# Patient Record
Sex: MALE | Race: WHITE | Employment: OTHER | URBAN - NONMETROPOLITAN AREA
[De-identification: names, ages, dates, MRNs, and addresses within clinical notes are randomized per-mention and may not be internally consistent; named-entity substitution may affect disease eponyms.]

---

## 2022-04-05 DIAGNOSIS — M19.90 ARTHRITIS: ICD-10-CM

## 2022-04-05 DIAGNOSIS — E78.00 HYPERCHOLESTEROLEMIA: ICD-10-CM

## 2022-05-04 PROBLEM — I25.10 CAD (CORONARY ARTERY DISEASE): Status: ACTIVE | Noted: 2022-05-04

## 2022-05-04 PROBLEM — E78.00 HYPERCHOLESTEROLEMIA: Status: ACTIVE | Noted: 2022-05-04

## 2022-05-04 PROBLEM — M19.90 ARTHRITIS: Status: ACTIVE | Noted: 2022-05-04

## 2022-05-04 RX ORDER — GLUCOSA SU 2KCL/CHONDROITIN SU 500-400 MG
100 CAPSULE ORAL DAILY
COMMUNITY
Start: 2020-07-06

## 2022-05-04 RX ORDER — LANOLIN ALCOHOL/MO/W.PET/CERES
400 CREAM (GRAM) TOPICAL DAILY
COMMUNITY
Start: 2020-07-06

## 2022-06-19 ENCOUNTER — APPOINTMENT (OUTPATIENT)
Dept: GENERAL RADIOLOGY | Age: 87
DRG: 177 | End: 2022-06-19
Payer: MEDICARE

## 2022-06-19 ENCOUNTER — APPOINTMENT (OUTPATIENT)
Dept: CT IMAGING | Age: 87
DRG: 177 | End: 2022-06-19
Payer: MEDICARE

## 2022-06-19 ENCOUNTER — HOSPITAL ENCOUNTER (INPATIENT)
Age: 87
LOS: 3 days | Discharge: HOME HEALTH | DRG: 177 | End: 2022-06-22
Attending: STUDENT IN AN ORGANIZED HEALTH CARE EDUCATION/TRAINING PROGRAM | Admitting: INTERNAL MEDICINE
Payer: MEDICARE

## 2022-06-19 DIAGNOSIS — U07.1 COVID-19: Primary | ICD-10-CM

## 2022-06-19 DIAGNOSIS — I48.91 ATRIAL FIBRILLATION, UNSPECIFIED TYPE (HCC): ICD-10-CM

## 2022-06-19 PROBLEM — R53.81 PHYSICAL DEBILITY: Status: ACTIVE | Noted: 2022-06-19

## 2022-06-19 LAB
ALBUMIN SERPL-MCNC: 4.3 G/DL (ref 3.5–5.1)
ALP BLD-CCNC: 85 U/L (ref 38–126)
ALT SERPL-CCNC: 17 U/L (ref 11–66)
ANION GAP SERPL CALCULATED.3IONS-SCNC: 14 MEQ/L (ref 8–16)
AST SERPL-CCNC: 31 U/L (ref 5–40)
BASE EXCESS MIXED: 2.2 MMOL/L (ref -2–3)
BASOPHILS # BLD: 0.5 %
BASOPHILS ABSOLUTE: 0 THOU/MM3 (ref 0–0.1)
BILIRUB SERPL-MCNC: 1.5 MG/DL (ref 0.3–1.2)
BUN BLDV-MCNC: 10 MG/DL (ref 7–22)
CALCIUM SERPL-MCNC: 10 MG/DL (ref 8.5–10.5)
CHLORIDE BLD-SCNC: 96 MEQ/L (ref 98–111)
CO2: 26 MEQ/L (ref 23–33)
COLLECTED BY:: NORMAL
CREAT SERPL-MCNC: 1 MG/DL (ref 0.4–1.2)
D-DIMER QUANTITATIVE: 2427 NG/ML FEU (ref 0–500)
EOSINOPHIL # BLD: 0.3 %
EOSINOPHILS ABSOLUTE: 0 THOU/MM3 (ref 0–0.4)
ERYTHROCYTE [DISTWIDTH] IN BLOOD BY AUTOMATED COUNT: 13 % (ref 11.5–14.5)
ERYTHROCYTE [DISTWIDTH] IN BLOOD BY AUTOMATED COUNT: 42.5 FL (ref 35–45)
FLU A ANTIGEN: NEGATIVE
FLU B ANTIGEN: NEGATIVE
GFR SERPL CREATININE-BSD FRML MDRD: 70 ML/MIN/1.73M2
GLUCOSE BLD-MCNC: 120 MG/DL (ref 70–108)
HCO3, MIXED: 28 MMOL/L (ref 23–28)
HCT VFR BLD CALC: 50.3 % (ref 42–52)
HEMOGLOBIN: 16.3 GM/DL (ref 14–18)
IMMATURE GRANS (ABS): 0.03 THOU/MM3 (ref 0–0.07)
IMMATURE GRANULOCYTES: 0.4 %
LACTIC ACID, SEPSIS: 1.6 MMOL/L (ref 0.5–1.9)
LIPASE: 25.4 U/L (ref 5.6–51.3)
LYMPHOCYTES # BLD: 4.3 %
LYMPHOCYTES ABSOLUTE: 0.3 THOU/MM3 (ref 1–4.8)
MAGNESIUM: 2.1 MG/DL (ref 1.6–2.4)
MCH RBC QN AUTO: 29.3 PG (ref 26–33)
MCHC RBC AUTO-ENTMCNC: 32.4 GM/DL (ref 32.2–35.5)
MCV RBC AUTO: 90.3 FL (ref 80–94)
MONOCYTES # BLD: 12.8 %
MONOCYTES ABSOLUTE: 1 THOU/MM3 (ref 0.4–1.3)
NUCLEATED RED BLOOD CELLS: 0 /100 WBC
O2 SAT, MIXED: 55 %
OSMOLALITY CALCULATION: 272.2 MOSMOL/KG (ref 275–300)
PCO2, MIXED VENOUS: 45 MMHG (ref 41–51)
PH, MIXED: 7.4 (ref 7.31–7.41)
PHOSPHORUS: 3.4 MG/DL (ref 2.4–4.7)
PLATELET # BLD: 192 THOU/MM3 (ref 130–400)
PMV BLD AUTO: 10 FL (ref 9.4–12.4)
PO2 MIXED: 29 MMHG (ref 25–40)
POTASSIUM REFLEX MAGNESIUM: 3.8 MEQ/L (ref 3.5–5.2)
PRO-BNP: 2270 PG/ML (ref 0–1800)
RBC # BLD: 5.57 MILL/MM3 (ref 4.7–6.1)
SARS-COV-2, NAAT: DETECTED
SEG NEUTROPHILS: 81.7 %
SEGMENTED NEUTROPHILS ABSOLUTE COUNT: 6.4 THOU/MM3 (ref 1.8–7.7)
SODIUM BLD-SCNC: 136 MEQ/L (ref 135–145)
TOTAL PROTEIN: 7.1 G/DL (ref 6.1–8)
TROPONIN T: < 0.01 NG/ML
TROPONIN T: < 0.01 NG/ML
TSH SERPL DL<=0.05 MIU/L-ACNC: 2.74 UIU/ML (ref 0.4–4.2)
WBC # BLD: 7.8 THOU/MM3 (ref 4.8–10.8)

## 2022-06-19 PROCEDURE — 6360000004 HC RX CONTRAST MEDICATION: Performed by: STUDENT IN AN ORGANIZED HEALTH CARE EDUCATION/TRAINING PROGRAM

## 2022-06-19 PROCEDURE — 87040 BLOOD CULTURE FOR BACTERIA: CPT

## 2022-06-19 PROCEDURE — 85379 FIBRIN DEGRADATION QUANT: CPT

## 2022-06-19 PROCEDURE — 82746 ASSAY OF FOLIC ACID SERUM: CPT

## 2022-06-19 PROCEDURE — 84484 ASSAY OF TROPONIN QUANT: CPT

## 2022-06-19 PROCEDURE — 99285 EMERGENCY DEPT VISIT HI MDM: CPT

## 2022-06-19 PROCEDURE — 72125 CT NECK SPINE W/O DYE: CPT

## 2022-06-19 PROCEDURE — 83880 ASSAY OF NATRIURETIC PEPTIDE: CPT

## 2022-06-19 PROCEDURE — 84443 ASSAY THYROID STIM HORMONE: CPT

## 2022-06-19 PROCEDURE — 83690 ASSAY OF LIPASE: CPT

## 2022-06-19 PROCEDURE — 86140 C-REACTIVE PROTEIN: CPT

## 2022-06-19 PROCEDURE — 1200000003 HC TELEMETRY R&B

## 2022-06-19 PROCEDURE — 70450 CT HEAD/BRAIN W/O DYE: CPT

## 2022-06-19 PROCEDURE — 72170 X-RAY EXAM OF PELVIS: CPT

## 2022-06-19 PROCEDURE — 36415 COLL VENOUS BLD VENIPUNCTURE: CPT

## 2022-06-19 PROCEDURE — 85025 COMPLETE CBC W/AUTO DIFF WBC: CPT

## 2022-06-19 PROCEDURE — 83605 ASSAY OF LACTIC ACID: CPT

## 2022-06-19 PROCEDURE — 87635 SARS-COV-2 COVID-19 AMP PRB: CPT

## 2022-06-19 PROCEDURE — 82803 BLOOD GASES ANY COMBINATION: CPT

## 2022-06-19 PROCEDURE — 82607 VITAMIN B-12: CPT

## 2022-06-19 PROCEDURE — 83036 HEMOGLOBIN GLYCOSYLATED A1C: CPT

## 2022-06-19 PROCEDURE — 99223 1ST HOSP IP/OBS HIGH 75: CPT | Performed by: INTERNAL MEDICINE

## 2022-06-19 PROCEDURE — 71275 CT ANGIOGRAPHY CHEST: CPT

## 2022-06-19 PROCEDURE — 71045 X-RAY EXAM CHEST 1 VIEW: CPT

## 2022-06-19 PROCEDURE — 87804 INFLUENZA ASSAY W/OPTIC: CPT

## 2022-06-19 PROCEDURE — 80053 COMPREHEN METABOLIC PANEL: CPT

## 2022-06-19 PROCEDURE — 83735 ASSAY OF MAGNESIUM: CPT

## 2022-06-19 PROCEDURE — 93005 ELECTROCARDIOGRAM TRACING: CPT

## 2022-06-19 PROCEDURE — 84145 PROCALCITONIN (PCT): CPT

## 2022-06-19 PROCEDURE — 84100 ASSAY OF PHOSPHORUS: CPT

## 2022-06-19 RX ORDER — ONDANSETRON 2 MG/ML
4 INJECTION INTRAMUSCULAR; INTRAVENOUS EVERY 6 HOURS PRN
Status: DISCONTINUED | OUTPATIENT
Start: 2022-06-19 | End: 2022-06-22 | Stop reason: HOSPADM

## 2022-06-19 RX ORDER — ASPIRIN 81 MG/1
81 TABLET ORAL DAILY
Status: DISCONTINUED | OUTPATIENT
Start: 2022-06-20 | End: 2022-06-22 | Stop reason: HOSPADM

## 2022-06-19 RX ORDER — LEVOFLOXACIN 5 MG/ML
750 INJECTION, SOLUTION INTRAVENOUS EVERY 24 HOURS
Status: DISCONTINUED | OUTPATIENT
Start: 2022-06-20 | End: 2022-06-20

## 2022-06-19 RX ORDER — FUROSEMIDE 10 MG/ML
40 INJECTION INTRAMUSCULAR; INTRAVENOUS DAILY
Status: DISCONTINUED | OUTPATIENT
Start: 2022-06-19 | End: 2022-06-19

## 2022-06-19 RX ORDER — SODIUM CHLORIDE 9 MG/ML
INJECTION, SOLUTION INTRAVENOUS PRN
Status: DISCONTINUED | OUTPATIENT
Start: 2022-06-19 | End: 2022-06-22 | Stop reason: HOSPADM

## 2022-06-19 RX ORDER — ATORVASTATIN CALCIUM 20 MG/1
20 TABLET, FILM COATED ORAL DAILY
Status: DISCONTINUED | OUTPATIENT
Start: 2022-06-20 | End: 2022-06-22 | Stop reason: HOSPADM

## 2022-06-19 RX ORDER — METOPROLOL TARTRATE 50 MG/1
25 TABLET, FILM COATED ORAL 2 TIMES DAILY
Status: DISCONTINUED | OUTPATIENT
Start: 2022-06-20 | End: 2022-06-22 | Stop reason: HOSPADM

## 2022-06-19 RX ORDER — SODIUM CHLORIDE 0.9 % (FLUSH) 0.9 %
10 SYRINGE (ML) INJECTION PRN
Status: DISCONTINUED | OUTPATIENT
Start: 2022-06-19 | End: 2022-06-22 | Stop reason: HOSPADM

## 2022-06-19 RX ORDER — ENOXAPARIN SODIUM 100 MG/ML
40 INJECTION SUBCUTANEOUS DAILY
Status: DISCONTINUED | OUTPATIENT
Start: 2022-06-20 | End: 2022-06-19

## 2022-06-19 RX ORDER — ACETAMINOPHEN 325 MG/1
650 TABLET ORAL EVERY 6 HOURS PRN
Status: DISCONTINUED | OUTPATIENT
Start: 2022-06-19 | End: 2022-06-22 | Stop reason: HOSPADM

## 2022-06-19 RX ORDER — FUROSEMIDE 10 MG/ML
20 INJECTION INTRAMUSCULAR; INTRAVENOUS DAILY
Status: DISCONTINUED | OUTPATIENT
Start: 2022-06-19 | End: 2022-06-19

## 2022-06-19 RX ORDER — ONDANSETRON 4 MG/1
4 TABLET, ORALLY DISINTEGRATING ORAL EVERY 8 HOURS PRN
Status: DISCONTINUED | OUTPATIENT
Start: 2022-06-19 | End: 2022-06-22 | Stop reason: HOSPADM

## 2022-06-19 RX ORDER — SODIUM CHLORIDE 0.9 % (FLUSH) 0.9 %
10 SYRINGE (ML) INJECTION EVERY 12 HOURS SCHEDULED
Status: DISCONTINUED | OUTPATIENT
Start: 2022-06-20 | End: 2022-06-22 | Stop reason: HOSPADM

## 2022-06-19 RX ORDER — ACETAMINOPHEN 650 MG/1
650 SUPPOSITORY RECTAL EVERY 6 HOURS PRN
Status: DISCONTINUED | OUTPATIENT
Start: 2022-06-19 | End: 2022-06-22 | Stop reason: HOSPADM

## 2022-06-19 RX ORDER — FUROSEMIDE 10 MG/ML
20 INJECTION INTRAMUSCULAR; INTRAVENOUS ONCE
Status: COMPLETED | OUTPATIENT
Start: 2022-06-20 | End: 2022-06-20

## 2022-06-19 RX ORDER — POLYETHYLENE GLYCOL 3350 17 G/17G
17 POWDER, FOR SOLUTION ORAL DAILY PRN
Status: DISCONTINUED | OUTPATIENT
Start: 2022-06-19 | End: 2022-06-22 | Stop reason: HOSPADM

## 2022-06-19 RX ADMIN — IOPAMIDOL 80 ML: 755 INJECTION, SOLUTION INTRAVENOUS at 20:49

## 2022-06-19 ASSESSMENT — ENCOUNTER SYMPTOMS
ABDOMINAL PAIN: 0
COUGH: 1
DIARRHEA: 0
SHORTNESS OF BREATH: 0
NAUSEA: 1
VOMITING: 1
RHINORRHEA: 0
CONSTIPATION: 0
EYE REDNESS: 0
SORE THROAT: 0

## 2022-06-20 ENCOUNTER — APPOINTMENT (OUTPATIENT)
Dept: CT IMAGING | Age: 87
DRG: 177 | End: 2022-06-20
Payer: MEDICARE

## 2022-06-20 ENCOUNTER — APPOINTMENT (OUTPATIENT)
Dept: INTERVENTIONAL RADIOLOGY/VASCULAR | Age: 87
DRG: 177 | End: 2022-06-20
Payer: MEDICARE

## 2022-06-20 PROBLEM — J12.82 PNEUMONIA DUE TO COVID-19 VIRUS: Status: ACTIVE | Noted: 2022-06-19

## 2022-06-20 PROBLEM — I48.20 ATRIAL FIBRILLATION, CHRONIC (HCC): Status: ACTIVE | Noted: 2022-06-20

## 2022-06-20 PROBLEM — K80.20 ASYMPTOMATIC CHOLELITHIASIS: Status: ACTIVE | Noted: 2022-06-20

## 2022-06-20 PROBLEM — R11.2 NON-INTRACTABLE VOMITING WITH NAUSEA: Status: ACTIVE | Noted: 2022-06-20

## 2022-06-20 PROBLEM — I50.9 ACUTE CONGESTIVE HEART FAILURE (HCC): Status: ACTIVE | Noted: 2022-06-20

## 2022-06-20 LAB
ANION GAP SERPL CALCULATED.3IONS-SCNC: 10 MEQ/L (ref 8–16)
AVERAGE GLUCOSE: 111 MG/DL (ref 70–126)
BILIRUBIN URINE: NEGATIVE
BLOOD, URINE: NEGATIVE
BUN BLDV-MCNC: 12 MG/DL (ref 7–22)
C DIFF TOXIN/ANTIGEN: NEGATIVE
C-REACTIVE PROTEIN: 2.99 MG/DL (ref 0–1)
CALCIUM SERPL-MCNC: 9 MG/DL (ref 8.5–10.5)
CHARACTER, URINE: CLEAR
CHLORIDE BLD-SCNC: 98 MEQ/L (ref 98–111)
CO2: 26 MEQ/L (ref 23–33)
COLOR: YELLOW
CREAT SERPL-MCNC: 1 MG/DL (ref 0.4–1.2)
EKG ATRIAL RATE: 103 BPM
EKG P AXIS: 32 DEGREES
EKG P-R INTERVAL: 176 MS
EKG Q-T INTERVAL: 352 MS
EKG Q-T INTERVAL: 394 MS
EKG QRS DURATION: 146 MS
EKG QRS DURATION: 146 MS
EKG QTC CALCULATION (BAZETT): 428 MS
EKG QTC CALCULATION (BAZETT): 516 MS
EKG R AXIS: 88 DEGREES
EKG R AXIS: 93 DEGREES
EKG T AXIS: -60 DEGREES
EKG T AXIS: -70 DEGREES
EKG VENTRICULAR RATE: 103 BPM
EKG VENTRICULAR RATE: 89 BPM
ERYTHROCYTE [DISTWIDTH] IN BLOOD BY AUTOMATED COUNT: 13.1 % (ref 11.5–14.5)
ERYTHROCYTE [DISTWIDTH] IN BLOOD BY AUTOMATED COUNT: 43.5 FL (ref 35–45)
FOLATE: 13.4 NG/ML (ref 4.8–24.2)
GFR SERPL CREATININE-BSD FRML MDRD: 70 ML/MIN/1.73M2
GLUCOSE BLD-MCNC: 103 MG/DL (ref 70–108)
GLUCOSE URINE: NEGATIVE MG/DL
HBA1C MFR BLD: 5.7 % (ref 4.4–6.4)
HCT VFR BLD CALC: 46.1 % (ref 42–52)
HEMOGLOBIN: 15.1 GM/DL (ref 14–18)
KETONES, URINE: NEGATIVE
LEUKOCYTE ESTERASE, URINE: NEGATIVE
LV EF: 48 %
LVEF MODALITY: NORMAL
MCH RBC QN AUTO: 29.9 PG (ref 26–33)
MCHC RBC AUTO-ENTMCNC: 32.8 GM/DL (ref 32.2–35.5)
MCV RBC AUTO: 91.3 FL (ref 80–94)
MRSA SCREEN RT-PCR: NEGATIVE
NITRITE, URINE: NEGATIVE
PH UA: 6.5 (ref 5–9)
PLATELET # BLD: 177 THOU/MM3 (ref 130–400)
PMV BLD AUTO: 9.7 FL (ref 9.4–12.4)
POTASSIUM SERPL-SCNC: 4.4 MEQ/L (ref 3.5–5.2)
PROCALCITONIN: 0.12 NG/ML (ref 0.01–0.09)
PROTEIN UA: NEGATIVE
RBC # BLD: 5.05 MILL/MM3 (ref 4.7–6.1)
SODIUM BLD-SCNC: 134 MEQ/L (ref 135–145)
SPECIFIC GRAVITY, URINE: 1.02 (ref 1–1.03)
TROPONIN T: 0.01 NG/ML
UROBILINOGEN, URINE: 0.2 EU/DL (ref 0–1)
VANCOMYCIN RESISTANT ENTEROCOCCUS: NEGATIVE
VITAMIN B-12: 699 PG/ML (ref 211–911)
WBC # BLD: 7.1 THOU/MM3 (ref 4.8–10.8)

## 2022-06-20 PROCEDURE — 93306 TTE W/DOPPLER COMPLETE: CPT

## 2022-06-20 PROCEDURE — 87899 AGENT NOS ASSAY W/OPTIC: CPT

## 2022-06-20 PROCEDURE — 84484 ASSAY OF TROPONIN QUANT: CPT

## 2022-06-20 PROCEDURE — 6360000002 HC RX W HCPCS: Performed by: INTERNAL MEDICINE

## 2022-06-20 PROCEDURE — 87449 NOS EACH ORGANISM AG IA: CPT

## 2022-06-20 PROCEDURE — 81003 URINALYSIS AUTO W/O SCOPE: CPT

## 2022-06-20 PROCEDURE — 1200000003 HC TELEMETRY R&B

## 2022-06-20 PROCEDURE — 93010 ELECTROCARDIOGRAM REPORT: CPT | Performed by: NUCLEAR MEDICINE

## 2022-06-20 PROCEDURE — 36415 COLL VENOUS BLD VENIPUNCTURE: CPT

## 2022-06-20 PROCEDURE — 2580000003 HC RX 258: Performed by: INTERNAL MEDICINE

## 2022-06-20 PROCEDURE — 99233 SBSQ HOSP IP/OBS HIGH 50: CPT | Performed by: HOSPITALIST

## 2022-06-20 PROCEDURE — 93970 EXTREMITY STUDY: CPT

## 2022-06-20 PROCEDURE — 85027 COMPLETE CBC AUTOMATED: CPT

## 2022-06-20 PROCEDURE — 74176 CT ABD & PELVIS W/O CONTRAST: CPT

## 2022-06-20 PROCEDURE — 87641 MR-STAPH DNA AMP PROBE: CPT

## 2022-06-20 PROCEDURE — 6370000000 HC RX 637 (ALT 250 FOR IP): Performed by: INTERNAL MEDICINE

## 2022-06-20 PROCEDURE — 80048 BASIC METABOLIC PNL TOTAL CA: CPT

## 2022-06-20 PROCEDURE — 87500 VANOMYCIN DNA AMP PROBE: CPT

## 2022-06-20 PROCEDURE — 93005 ELECTROCARDIOGRAM TRACING: CPT

## 2022-06-20 PROCEDURE — 6360000002 HC RX W HCPCS: Performed by: STUDENT IN AN ORGANIZED HEALTH CARE EDUCATION/TRAINING PROGRAM

## 2022-06-20 RX ORDER — POTASSIUM CHLORIDE 20 MEQ/1
20 TABLET, EXTENDED RELEASE ORAL PRN
Status: DISCONTINUED | OUTPATIENT
Start: 2022-06-20 | End: 2022-06-22 | Stop reason: HOSPADM

## 2022-06-20 RX ORDER — POTASSIUM CHLORIDE 20 MEQ/1
20 TABLET, EXTENDED RELEASE ORAL ONCE
Status: DISCONTINUED | OUTPATIENT
Start: 2022-06-20 | End: 2022-06-20

## 2022-06-20 RX ORDER — GUAIFENESIN/DEXTROMETHORPHAN 100-10MG/5
5 SYRUP ORAL EVERY 4 HOURS PRN
Status: DISCONTINUED | OUTPATIENT
Start: 2022-06-20 | End: 2022-06-22 | Stop reason: HOSPADM

## 2022-06-20 RX ORDER — POTASSIUM CHLORIDE 20 MEQ/1
40 TABLET, EXTENDED RELEASE ORAL ONCE
Status: COMPLETED | OUTPATIENT
Start: 2022-06-20 | End: 2022-06-20

## 2022-06-20 RX ORDER — IPRATROPIUM BROMIDE AND ALBUTEROL SULFATE 2.5; .5 MG/3ML; MG/3ML
1 SOLUTION RESPIRATORY (INHALATION) EVERY 4 HOURS PRN
Status: DISCONTINUED | OUTPATIENT
Start: 2022-06-20 | End: 2022-06-22 | Stop reason: HOSPADM

## 2022-06-20 RX ORDER — POTASSIUM CHLORIDE 20 MEQ/1
40 TABLET, EXTENDED RELEASE ORAL PRN
Status: DISCONTINUED | OUTPATIENT
Start: 2022-06-20 | End: 2022-06-22 | Stop reason: HOSPADM

## 2022-06-20 RX ORDER — POTASSIUM CHLORIDE 7.45 MG/ML
10 INJECTION INTRAVENOUS PRN
Status: DISCONTINUED | OUTPATIENT
Start: 2022-06-20 | End: 2022-06-22 | Stop reason: HOSPADM

## 2022-06-20 RX ORDER — ENOXAPARIN SODIUM 100 MG/ML
40 INJECTION SUBCUTANEOUS EVERY 24 HOURS
Status: DISCONTINUED | OUTPATIENT
Start: 2022-06-20 | End: 2022-06-22 | Stop reason: HOSPADM

## 2022-06-20 RX ADMIN — ASPIRIN 81 MG: 81 TABLET, COATED ORAL at 09:50

## 2022-06-20 RX ADMIN — METOPROLOL TARTRATE 25 MG: 50 TABLET, FILM COATED ORAL at 20:29

## 2022-06-20 RX ADMIN — GUAIFENESIN AND DEXTROMETHORPHAN 5 ML: 100; 10 SYRUP ORAL at 20:29

## 2022-06-20 RX ADMIN — ENOXAPARIN SODIUM 40 MG: 100 INJECTION SUBCUTANEOUS at 20:29

## 2022-06-20 RX ADMIN — GUAIFENESIN AND DEXTROMETHORPHAN 5 ML: 100; 10 SYRUP ORAL at 10:52

## 2022-06-20 RX ADMIN — SODIUM CHLORIDE, PRESERVATIVE FREE 10 ML: 5 INJECTION INTRAVENOUS at 09:51

## 2022-06-20 RX ADMIN — ONDANSETRON 4 MG: 2 INJECTION INTRAMUSCULAR; INTRAVENOUS at 20:34

## 2022-06-20 RX ADMIN — ONDANSETRON 4 MG: 2 INJECTION INTRAMUSCULAR; INTRAVENOUS at 09:50

## 2022-06-20 RX ADMIN — LEVOFLOXACIN 750 MG: 5 INJECTION, SOLUTION INTRAVENOUS at 03:11

## 2022-06-20 RX ADMIN — METOPROLOL TARTRATE 25 MG: 50 TABLET, FILM COATED ORAL at 03:14

## 2022-06-20 RX ADMIN — ATORVASTATIN CALCIUM 20 MG: 20 TABLET, FILM COATED ORAL at 10:08

## 2022-06-20 RX ADMIN — FUROSEMIDE 20 MG: 40 INJECTION, SOLUTION INTRAMUSCULAR; INTRAVENOUS at 03:11

## 2022-06-20 RX ADMIN — GUAIFENESIN AND DEXTROMETHORPHAN 5 ML: 100; 10 SYRUP ORAL at 03:14

## 2022-06-20 RX ADMIN — POTASSIUM CHLORIDE 40 MEQ: 1500 TABLET, EXTENDED RELEASE ORAL at 03:15

## 2022-06-20 RX ADMIN — METOPROLOL TARTRATE 25 MG: 50 TABLET, FILM COATED ORAL at 10:04

## 2022-06-20 RX ADMIN — SODIUM CHLORIDE, PRESERVATIVE FREE 10 ML: 5 INJECTION INTRAVENOUS at 20:29

## 2022-06-20 ASSESSMENT — PAIN SCALES - GENERAL
PAINLEVEL_OUTOF10: 0

## 2022-06-20 ASSESSMENT — ENCOUNTER SYMPTOMS
EYES NEGATIVE: 1
VOMITING: 1
NAUSEA: 1
ALLERGIC/IMMUNOLOGIC NEGATIVE: 1
COUGH: 1

## 2022-06-20 ASSESSMENT — LIFESTYLE VARIABLES
HOW OFTEN DO YOU HAVE A DRINK CONTAINING ALCOHOL: 2-3 TIMES A WEEK
HOW MANY STANDARD DRINKS CONTAINING ALCOHOL DO YOU HAVE ON A TYPICAL DAY: 1 OR 2

## 2022-06-20 NOTE — H&P
Patient: Jose C Lo    Unit/Bed: 4K-08/008-A    YOB: 1932    MRN: [id number]    Acct: [de-identified]     Admitting Diagnosis: Physical debility [R53.81]  Atrial fibrillation, unspecified type ([facility] [address] [I48.91]  COVID-19 virus infection [U07.1]  COVID-19 [U07.1]    Admit Date:  6/19/2022    CC: Weakness, productive cough, nausea and vomiting x3 days    HPI :  80-year-old female patient brought to the ED because of a 3-day history of productive cough, nausea vomiting, general body weakness. Patient had increased nausea and vomiting today after attempting to eat today. Noted to be incontinent of urine. Tested positive for COVID-19 infection in the ED. Patient is fully vaccinated. His wife passed away 5 days ago from cancer. Initial vital signs temperature 37.0 °C, respirate of 17, heart rate 106 beats minute, blood pressure 156/89, oxygen saturation 98% on room air. Initial labs serum sodium 136, potassium 3.8, chloride 96, CO2 26, BUN 10, creatinine 1.0, lactic acid 1.6, blood sugar 120, calcium 10.0, serum osmolality 272.2, total protein 7.1, proBNP 2270, troponin first set less than 0.010, albumin 4.3, alkaline phosphatase 85, ALT 17, AST 31, total bilirubin 1.5, lipase 25.4, TSH 2.740. WBC 7.8, hemoglobin 16.3, MCV 90.3, and platelets 462. Twelve-lead EKG which reviewed shows atrial fibrillation at 89 bpm.  QTC of 428 ms. Suspected T wave inversion in V3 through to V6. No ST segment elevation or depression. CTA chest which I reviewed negative for pulmonary embolism disease. Mild pulmonary congestion. Review of Systems   Constitutional: Positive for fatigue. HENT: Negative. Eyes: Negative. Respiratory: Positive for cough. Cardiovascular: Negative. Gastrointestinal: Positive for nausea and vomiting. Genitourinary: Negative. Musculoskeletal: Negative. Skin: Negative. Allergic/Immunologic: Negative. Neurological: Positive for weakness. Hematological: Negative. Psychiatric/Behavioral: Negative. All other systems reviewed and are negative. Past Medical History:        Diagnosis Date    Arthritis     CAD (coronary artery disease)     Dementia ([facility] [address]     Hypercholesterolemia        Past Surgical History:    History reviewed. No pertinent surgical history. Medications Prior to Admission:    Prior to Admission medications    Medication Sig Start Date End Date Taking? Authorizing Provider   GARLIC PO  1/5/76   Historical Provider, MD   magnesium oxide (MAG-OX) 400 (240 Mg) MG tablet 400 mg 7/6/20   Historical Provider, MD   Atorvastatin Calcium (LIPITOR PO)  7/6/20   Historical Provider, MD   Coenzyme Q10 (CO Q10) 100 MG CAPS  7/6/20   Historical Provider, MD   MILK THISTLE EXTRACT PO  7/6/20   Historical Provider, MD   Metoprolol Tartrate (LOPRESSOR PO) 25 mg 7/6/20   Historical Provider, MD   aspirin (ECOTRIN) 325 MG EC tablet  7/6/20   Historical Provider, MD       Allergies:    Pcn [penicillins]    Social History:    TOBACCO:   has no history on file for tobacco use. ETOH:   has no history on file for alcohol use. Family History:    History reviewed. No pertinent family history. Objective:   BP (!) 155/83   Pulse 100   Temp 99.2 °F (37.3 °C) (Oral) Comment: Admission 4K8 cannot complete transfer   Resp 20   Ht 5' 10\" (1.778 m)   Wt 168 lb 6.4 oz (76.4 kg)   SpO2 95%   BMI 24.16 kg/m²   No intake or output data in the 24 hours ending 06/20/22 0109    Physical Exam  Vitals and nursing note reviewed. Constitutional:       General: He is not in acute distress. Appearance: Normal appearance. He is normal weight. He is not ill-appearing, toxic-appearing or diaphoretic. HENT:      Head: Normocephalic and atraumatic. Right Ear: External ear normal.      Left Ear: External ear normal.      Nose: Nose normal. No congestion or rhinorrhea.       Mouth/Throat:      Mouth: Mucous membranes are moist.      Pharynx: Oropharynx is clear. No oropharyngeal exudate or posterior oropharyngeal erythema. Eyes:      General: No scleral icterus. Right eye: No discharge. Left eye: No discharge. Extraocular Movements: Extraocular movements intact. Conjunctiva/sclera: Conjunctivae normal.      Pupils: Pupils are equal, round, and reactive to light. Neck:      Vascular: No carotid bruit. Cardiovascular:      Rate and Rhythm: Normal rate. Rhythm irregular. Pulses: Normal pulses. Heart sounds: Normal heart sounds. No murmur heard. No friction rub. No gallop. Pulmonary:      Effort: Pulmonary effort is normal. No respiratory distress. Breath sounds: Normal breath sounds. No stridor. No wheezing, rhonchi or rales. Chest:      Chest wall: No tenderness. Abdominal:      General: Bowel sounds are normal. There is no distension. Palpations: Abdomen is soft. There is no mass. Tenderness: There is no abdominal tenderness. There is no right CVA tenderness, left CVA tenderness, guarding or rebound. Hernia: No hernia is present. Musculoskeletal:         General: No swelling, tenderness, deformity or signs of injury. Normal range of motion. Cervical back: Normal range of motion and neck supple. No rigidity or tenderness. Right lower leg: No edema. Left lower leg: No edema. Lymphadenopathy:      Cervical: No cervical adenopathy. Skin:     General: Skin is warm. Coloration: Skin is not jaundiced or pale. Findings: No bruising, erythema, lesion or rash. Neurological:      General: No focal deficit present. Mental Status: He is alert and oriented to person, place, and time. Mental status is at baseline. Cranial Nerves: No cranial nerve deficit. Sensory: No sensory deficit. Motor: No weakness.       Coordination: Coordination normal.      Gait: Gait normal.      Deep Tendon Reflexes: Reflexes normal.   Psychiatric:         Mood and Affect: Mood normal.         Behavior: Behavior normal.         Thought Content: Thought content normal.         Judgment: Judgment normal.     Medications:     Continuous Infusions:    PRN Meds:    Data:    CBC:   Recent Labs     06/19/22 1900   WBC 7.8   RBC 5.57   HGB 16.3   HCT 50.3   MCV 90.3          BMP:   Recent Labs     06/19/22 1900 06/19/22  2312     --    K 3.8  --    CL 96*  --    CO2 26  --    PHOS  --  3.4   BUN 10  --    CREATININE 1.0  --        PT/INR: No results for input(s): PROTIME, INR in the last 72 hours. LIVER PROFILE:   Recent Labs     06/19/22 1900   AST 31   ALT 17   BILITOT 1.5*   ALKPHOS 85      Results for Isauro Forte (MRN [id number]) as of 6/20/2022 00:46   Ref. Range 6/19/2022 19:00   D-Dimer, Quant Latest Ref Range: 0.00 - 500.00 ng/ml FEU 2427.00 (H)       Results for Isauro Forte (MRN [id number]) as of 6/19/2022 22:46   Ref. Range 6/19/2022 19:00   Pro-BNP Latest Ref Range: 0.0 - 1800.0 pg/mL 2270.0 (H)   Troponin T Latest Units: ng/ml < 0.010     Results for Isauro Forte (MRN [id number]) as of 6/19/2022 22:46   Ref. Range 6/19/2022 19:00   TSH Latest Ref Range: 0.400 - 4.200 uIU/mL 2.740     Results for Isauro Forte (MRN [id number]) as of 6/20/2022 00:46   Ref. Range 6/19/2022 23:12   Magnesium Latest Ref Range: 1.6 - 2.4 mg/dL 2.1   Phosphorus Latest Ref Range: 2.4 - 4.7 mg/dL 3.4   Procalcitonin Latest Ref Range: 0.01 - 0.09 ng/mL 0.12 (H)   AVERAGE GLUCOSE Latest Ref Range: 70 - 126 mg/dL 111   CRP Latest Ref Range: 0.00 - 1.00 mg/dl 2.99 (H)   Troponin T Latest Units: ng/ml < 0.010   Hemoglobin A1C Latest Ref Range: 4.4 - 6.4 % 5.7       VBG. Results for Isauro Forte (MRN [id number]) as of 6/19/2022 22:46   Ref.  Range 6/19/2022 20:50   PCO2, MIXED VENOUS Latest Ref Range: 41 - 51 mmhg 45   PO2, Mixed Latest Ref Range: 25 - 40 mmhg 29   HCO3, Mixed Latest Ref Range: 23 - 28 mmol/l 28   Base Exc, Mixed Latest Ref Range: -2.0 - 3.0 mmol/l 2.2   O2 Sat, Mixed Latest Units: % 55   PH MIXED Latest Ref Range: 7.31 - 7.41  7.40   COLLECTED BY: Unknown [id number]       URINALYSIS. None. SEROLOGY  None. TUMOR MARKERS. None. MICROBIOLOGY   Results for Nhung Clayton (MRN [id number]) as of 6/19/2022 22:46   Ref. Range 6/19/2022 19:35   Flu A Antigen Latest Ref Range: NEGATIVE  Negative   Flu B Antigen Latest Ref Range: NEGATIVE  Negative   RAPID INFLUENZA A/B ANTIGENS Unknown Rpt   SARS-CoV-2, NAAT Latest Ref Range: NOT DETECTED  DETECTED (AA)       HISTOPATHOLOGY. None. TOXICOLOGY. None. ENDOSCOPE STUDIES. None. SURGICAL PROCEDURES. None. CARDIAC PROCEDURES. None. IR PROCEDURES. None. RADIOLOGY. CTA chest-6/19/2022. FINDINGS:  No filling defects are noted to suggest pulmonary embolus. Main pulmonary artery normal in caliber.     No significant focal parenchymal abnormalities. No significant mediastinal or hilar adenopathy. No free pleural fluid noted. No acute bony abnormalities. Cholelithiasis without gallbladder distention     Thoracic aorta normal caliber without dissection. Heart size enlarged. Great vessel origins patent. Dense coronary calcifications.     IMPRESSION:  No evidence of pulmonary embolus.     This document has been electronically signed by: Kurt Ross MD on   06/19/2022 09:09 PM.    CXR-6/19/2022. FINDINGS:  Lungs are clear without acute infiltrates. No pneumothorax. Heart size enlarged. Calcified left axillary lymph node. No acute bony abnormalities.     IMPRESSION:  No acute processes. Pelvic x-ray-6/19/2022. FINDINGS:  There is no pelvic fracture. Degenerative changes in the lower lumbar spine are incompletely visualized. Bones are osteopenic. Atherosclerotic vascular calcifications are present.     IMPRESSION:  No pelvic fracture. ASSESSMENT AND  PLAN. 1.NEUROVASCULAR. Dementia w/o behavioral disturbances. 2.PULMONARY.         Acute COVID-19 pneumonia w/o hypoxemia- IV Levaquin. Procalcitonin 0.12     Strep and Legionella urine antigen. Sputum studies. 3.CARDIOVASCULAR. Acute CHF exacerbation-IV Lasix 20 mg x 1 dose. Chronic A. fib w/ CVR - continue p.o. metoprolol, telemetry     CAD w/o angina     Echocardiogram for LV function evaluation. Cardiology consult    4.GI. Suspected COVID 19 induced GI symptoms w/ N/V-IV antiemetic. Asymptomatic cholelithiasis. CT abdomen pelvis w/o contrast.    5.RENAL AND ELECTROLYTES. Keep K > 4 and mag > 2     K3.8, mag 2.1, phosphorus 3.4. 6.ID. UA to rule out UTI. Blood cultures drawn and pending    7. ENDO.     TSH 2.740 and A1c 5.7.    8.MUSCULOSKELETAL. Chronic b/l knee DJD. COVID-19 associated physical debility w/ multiple falls-PT OT evaluation. Case management for rehab placement. Family not interested in nursing home placement . 9. HEM-ONC. CRP 2.99     Elevated D-dimer at 2427. CTA chest negative for PE, bilateral lower extremity DVT evaluation    10. DISPO. Planned d/c to home vs rehab soon.       Electronically signed by Pricila Mayes MD on 6/20/2022 at 1:09 AM

## 2022-06-20 NOTE — CARE COORDINATION
DISCHARGE/PLANNING EVALUATION  6/20/22, 11:00 AM EDT    Reason for Referral: Discharge planning  Mental Status: Alert and Oriented  Decision Making: Self with family support  Family/Social/Home Environment: Patient lives alone. Patient's spouse just passed away a week ago. Patient has supportive family and friends. Pateint's daughter Sharin Hammans reported that between her and her siblings patient is not alone for more than a few hours at a time. Current Services including food security, transportation and housekeeping: Patient relies on his family to cook, clean, shop, and  Transport. Patient was able to dress and bathe himself. Denied having any current services. Current Equipment: cane, walker, commode  Payment Source: Medicare  Concerns or Barriers to Discharge: None  Post acute provider list with quality measures, geographic area and applicable managed care information provided. Questions regarding selection process answered: Offered    Teach Back Method used with patient and daughter Sharin Hammans regarding care plan and needs. Patient and Sharin Hammans verbalize understanding of the plan of care and contribute to goal setting. Patient goals, treatment preferences and discharge plan: Patient plans to return home at discharge. Patient and family is wanting new HH at discharge. SW provided list. Daughters and patient are refusing ECF at this time. Family to transport patient at discharge.      Electronically signed by CHARLIE Rodriguez on 6/20/2022 at 11:00 AM

## 2022-06-20 NOTE — PROGRESS NOTES
Patient resting in bed eyes closed family in room with patient,Patient safety education completed with family for bedside transfer to bedside commode with use of gait belt, family demonstrated understanding. Patient denies pain at this time.

## 2022-06-20 NOTE — ED NOTES
Patient resting in bed. Respirations easy and unlabored. No distress noted. Call light within reach.   Dr Vladimir Chacon at bedside for updated POC     Marc Nj RN  06/19/22 2041

## 2022-06-20 NOTE — ED NOTES
Patient transferred to [facility] room 008 nurse informed.       Patricia Cresop  06/20/22 0009

## 2022-06-20 NOTE — CARE COORDINATION
6/20/22, 12:19 PM EDT  DISCHARGE PLANNING EVALUATION:    Kiera Albarado       Admitted: 6/19/2022/ [facility] day: 1   Location: [facility]08/008 Reason for admit: Physical debility [R53.81]  Atrial fibrillation, unspecified type ([facility] [address] [I48.91]  COVID-19 virus infection [U07.1]  COVID-19 [U07.1]   PMH:  has a past medical history of Arthritis, CAD (coronary artery disease), Dementia ([facility] [address] and Hypercholesterolemia. Barriers to Discharge:  Pneumonia/A-fib. History; falls. IV Diuretic x1 today  PCP: Octavio Marina DO  Readmission Risk Score: 10.6 ( )%  Patient's Healthcare Decision Maker: Legal Next of Kin daughter/OLU Husain    Patient Goals/Plan/Treatment Preferences: lives alone; plans home w daughter Alvina Matthews, [facility] (nsg, therapy, aide); has cane, walker; client is [facility]  Transportation/Food Security/Housekeeping Addressed:  No issues identified.      Update: plans home 6/22; SW following for [facility]

## 2022-06-20 NOTE — FLOWSHEET NOTE
Advance Care Planning     Advance Care Planning Inpatient Note  [facility] Department    Today's Date: 6/20/2022  Unit: [facility] ICU STEPDOWN TELEMETRY 4K    Received request from IDT Member. Upon review of chart and communication with care team, patient's decision making abilities are not in question. . Patient was/were present in the room during visit. Goals of ACP Conversation:  Discuss advance care planning documents    Health Care Decision Makers:       Primary Decision Maker: Simon Walker - Child - [phone]    Secondary Decision Maker: Magda Portillo - Child - [phone]    Supplemental (Other) Decision Maker: Maxim Ladd - Child - [phone]    Summary:  Completed [address] was able to complete a Healthcare Power of  document naming his daughter Paty Garcia as his primary decision maker. Paty Garcia thinks that Merritt Castrejon has a Living Will Document already and will coordinate with her sister to get a copy of this document to the hospital.    [address] (Patient Wishes):  Healthcare Power of /Advance Directive Appointment of Health Care Agent     Assessment:  Merritt Castrejon is in isolation for COVID and has hearing loss. He was awake and alert and able to answer questions and sign documents.     Interventions:  Provided education on documents for clarity and greater understanding  Discussed and provided education on state decision maker hierarchy  Assisted in the completion of documents according to patient's wishes at this time    Care Preferences Communicated:   No    Outcomes/Plan:  ACP Discussion: Completed    Electronically signed by Yifan Farley Numerex CollingsworthSembraire on 6/20/2022 at 3:09 PM

## 2022-06-20 NOTE — PLAN OF CARE
Problem: Discharge Planning  Goal: Discharge to home or other facility with appropriate resources  Outcome: Progressing  Flowsheets (Taken 6/20/2022 0133)  Discharge to home or other facility with appropriate resources:   Identify barriers to discharge with patient and caregiver   Identify discharge learning needs (meds, wound care, etc)   Arrange for needed discharge resources and transportation as appropriate     Problem: Pain  Goal: Verbalizes/displays adequate comfort level or baseline comfort level  Outcome: Progressing  Flowsheets (Taken 6/20/2022 0133)  Verbalizes/displays adequate comfort level or baseline comfort level:   Encourage patient to monitor pain and request assistance   Assess pain using appropriate pain scale   Administer analgesics based on type and severity of pain and evaluate response   Implement non-pharmacological measures as appropriate and evaluate response     Problem: Safety - Adult  Goal: Free from fall injury  Outcome: Progressing  Flowsheets (Taken 6/20/2022 0133)  Free From Fall Injury: Instruct family/caregiver on patient safety  Note: Bed in low position  Call light in reach  Bed wheel lock  Teaching to use call light for assistance. Problem: ABCDS Injury Assessment  Goal: Absence of physical injury  Outcome: Progressing  Flowsheets (Taken 6/20/2022 0133)  Absence of Physical Injury: Implement safety measures based on patient assessment  Note: Bed in low position  Call light in reach  Bed wheel lock  Teaching to use call light for assistance. Problem: Respiratory - Adult  Goal: Achieves optimal ventilation and oxygenation  Outcome: Progressing  Flowsheets (Taken 6/20/2022 0133)  Achieves optimal ventilation and oxygenation:   Assess for changes in respiratory status   Assess for changes in mentation and behavior   Position to facilitate oxygenation and minimize respiratory effort  Note: Cough and deep breathe promotion.   Encourage turning to improve airway clearance Problem: Cardiovascular - Adult  Goal: Maintains optimal cardiac output and hemodynamic stability  Outcome: Progressing  Flowsheets (Taken 6/20/2022 0133)  Maintains optimal cardiac output and hemodynamic stability: Monitor blood pressure and heart rate     Problem: Cardiovascular - Adult  Goal: Absence of cardiac dysrhythmias or at baseline  Outcome: Progressing  Flowsheets (Taken 6/20/2022 0133)  Absence of cardiac dysrhythmias or at baseline:   Monitor cardiac rate and rhythm   Assess for signs of decreased cardiac output     Problem: Skin/Tissue Integrity - Adult  Goal: Skin integrity remains intact  Outcome: Progressing  Flowsheets (Taken 6/20/2022 0133)  Skin Integrity Remains Intact: Monitor for areas of redness and/or skin breakdown  Note: Encouraging good fluid and diet intake. Encourage hygiene  Increase movement and encourage turns. Problem: Musculoskeletal - Adult  Goal: Return mobility to safest level of function  Outcome: Progressing  Flowsheets (Taken 6/20/2022 0133)  Return Mobility to Safest Level of Function: Assess patient stability and activity tolerance for standing, transferring and ambulating with or without assistive devices      Care plan reviewed with patient. Patient verbalizes understanding of the plan of care and contributes to goal setting. Pain Assessment: 0-10  Pain Level: 0   Patient's Stated Pain Goal: 0 - No pain   Is pain goal met at this time?   Yes

## 2022-06-20 NOTE — PLAN OF CARE
Problem: Discharge Planning  Goal: Discharge to home or other facility with appropriate resources  6/20/2022 1103 by Claribel Whitmore, FERNANDAW  Outcome: Progressing  SW consult received and completed. See SW note.

## 2022-06-20 NOTE — ED PROVIDER NOTES
[facility] ENCOUNTER          Pt Name: Tye Duran  MRN: [id number]  [place] 3/22/1932  Date of evaluation: 6/19/2022  Physician: Luis Keller MD    CHIEF COMPLAINT       Chief Complaint   Patient presents with    Fall     History obtained from multiple family members at bedside and the patient. HISTORY OF PRESENT ILLNESS    HPI  Tye Duran is a 80 y.o. male with a history of CAD, hypercholesterolemia, arthritis, possible dementia who presents to the emergency department for evaluation of multiple falls. Patient lost his wife to cancer 5 days ago and over the past few days has had increasing cough, multiple falls, generalized weakness, 1 episode of nonbloody nonbilious emesis, and \"heartburn\". Patient denies any symptoms at the time of evaluation but per family members, he has had significantly increasing cough, difficulty ambulating, and multiple falls where they are unsure if he hit his head but was mostly lowered to the ground. Patient has had increased incontinence. Per family at bedside they do not believe he has dementia though this had been previously documented. They state that patient is at his mental baseline at this time but is just very weak. He takes aspirin but is not on any other blood thinners. The patient has no other acute complaints at this time. REVIEW OF SYSTEMS   Review of Systems   Constitutional: Positive for fatigue. Negative for chills and fever. HENT: Negative for rhinorrhea and sore throat. Eyes: Negative for redness and visual disturbance. Respiratory: Positive for cough. Negative for shortness of breath. Cardiovascular: Positive for chest pain. Gastrointestinal: Positive for nausea and vomiting. Negative for abdominal pain, constipation and diarrhea. Endocrine: Negative for polyuria. Genitourinary: Negative for dysuria. Musculoskeletal: Negative for arthralgias and myalgias.    Skin: Tobacco Use    Smoking status: Never Smoker    Smokeless tobacco: Never Used   Substance Use Topics    Alcohol use: Yes     Alcohol/week: 14.0 standard drinks     Types: 14 Cans of beer per week    Drug use: Never         ALLERGIES     Allergies   Allergen Reactions    Pcn [Penicillins]          FAMILY HISTORY   History reviewed. No pertinent family history. PREVIOUS RECORDS   Previous records reviewed: This is this patient's first visit to [facility], no previous records available on EMR. .        PHYSICAL EXAM     ED Triage Vitals [06/19/22 1850]   BP Temp Temp Source Heart Rate Resp SpO2 Height Weight   (!) 156/89 98.6 °F (37 °C) Oral (!) 106 17 98 % -- 160 lb (72.6 kg)     Initial vital signs and nursing assessment reviewed and normal. Body mass index is 24.16 kg/m². Pulsoximetry is normal per my interpretation. Additional Vital Signs:  Vitals:    06/20/22 0400   BP:    Pulse: 99   Resp: 20   Temp:    SpO2:        Physical Exam  Constitutional:       General: He is not in acute distress. Appearance: Normal appearance. He is ill-appearing and diaphoretic. HENT:      Head: Normocephalic and atraumatic. Mouth/Throat:      Mouth: Mucous membranes are moist.   Eyes:      Extraocular Movements: Extraocular movements intact. Conjunctiva/sclera: Conjunctivae normal.      Pupils: Pupils are equal, round, and reactive to light. Cardiovascular:      Rate and Rhythm: Normal rate. Rhythm irregular. Pulses: Normal pulses. Heart sounds: Normal heart sounds. Pulmonary:      Effort: Pulmonary effort is normal.      Breath sounds: Normal breath sounds. Abdominal:      General: Abdomen is flat. Palpations: Abdomen is soft. Tenderness: There is no abdominal tenderness. Musculoskeletal:         General: Normal range of motion. Cervical back: Normal range of motion and neck supple. Skin:     General: Skin is warm.       Capillary Refill: Capillary refill takes less than 2 seconds. Neurological:      General: No focal deficit present. Mental Status: He is alert and oriented to person, place, and time. Psychiatric:         Mood and Affect: Mood normal.         Behavior: Behavior normal.             MEDICAL DECISION MAKING   Initial Assessment:   Yoly Ferrari is a 80 y.o. male with a history of CAD, hypercholesterolemia, arthritis, possible dementia who presents to the emergency department for evaluation of multiple falls. Patient is hemodynamically stable and in no distress though initially appeared ill and mildly diaphoretic which improved. Differential diagnosis includes but is not limited to infection such as UTI, pneumonia, COVID, influenza, intracranial hemorrhage, dehydration, electrolyte abnormalities, ACS, CHF.     Plan:    CBC, CMP, lipase, troponin, BNP, lactate, D-dimer, TSH, rapid COVID and influenza, blood cultures, VBG   CT head, CT C-spine, chest x-ray, pelvis x-ray        ED RESULTS   Laboratory results:  Labs Reviewed   COVID-19, RAPID - Abnormal; Notable for the following components:       Result Value    SARS-CoV-2, NAAT DETECTED (*)     All other components within normal limits   CBC WITH AUTO DIFFERENTIAL - Abnormal; Notable for the following components:    Lymphocytes Absolute 0.3 (*)     All other components within normal limits   COMPREHENSIVE METABOLIC PANEL W/ REFLEX TO MG FOR LOW K - Abnormal; Notable for the following components:    Glucose 120 (*)     Chloride 96 (*)     Total Bilirubin 1.5 (*)     All other components within normal limits   BRAIN NATRIURETIC PEPTIDE - Abnormal; Notable for the following components:    Pro-BNP 2270.0 (*)     All other components within normal limits   D-DIMER, QUANTITATIVE - Abnormal; Notable for the following components:    D-Dimer, Quant 2427.00 (*)     All other components within normal limits   GLOMERULAR FILTRATION RATE, ESTIMATED - Abnormal; Notable for the following components:    Est, Glom Filt Rate 70 (*)     All other components within normal limits   OSMOLALITY - Abnormal; Notable for the following components:    Osmolality Calc 272.2 (*)     All other components within normal limits   PROCALCITONIN - Abnormal; Notable for the following components:    Procalcitonin 0.12 (*)     All other components within normal limits   C-REACTIVE PROTEIN - Abnormal; Notable for the following components:    CRP 2.99 (*)     All other components within normal limits   RAPID INFLUENZA A/B ANTIGENS   VRE SCREEN BY PCR   CULTURE, BLOOD 1   CULTURE, BLOOD 2   CULTURE, RESPIRATORY   STREP PNEUMONIAE ANTIGEN   LEGIONELLA ANTIGEN, URINE   LIPASE   TROPONIN   URINALYSIS WITH REFLEX TO CULTURE   LACTATE, SEPSIS   BLOOD GAS, VENOUS   TSH WITH REFLEX   ANION GAP   VITAMIN B12 & FOLATE   HEMOGLOBIN A1C   MAGNESIUM   PHOSPHORUS   TROPONIN   MRSA BY PCR   TROPONIN       Radiologic studies results:  CTA CHEST W WO CONTRAST - r/o Pulmonary Embolism   Final Result   Impression:      No evidence of pulmonary embolus. This document has been electronically signed by: Halima Cheney MD on    06/19/2022 09:09 PM      All CTs at this facility use dose modulation techniques and iterative    reconstructions, and/or weight-based dosing   when appropriate to reduce radiation to a low as reasonably achievable. XR PELVIS (1-2 VIEWS)   Final Result      No pelvic fracture. Final report electronically signed by Dr. Sofiya Woodall on 6/19/2022 8:10 PM      XR CHEST 1 VIEW   Final Result   Impression:      No acute processes. This document has been electronically signed by: Halima Cheney MD on    06/19/2022 09:08 PM      CT Head WO Contrast   Final Result   1. No mass effect or acute hemorrhage. 2. Chronic periventricular small vessel ischemic changes and cerebral atrophy. **This report has been created using voice recognition software. It may contain minor errors which are inherent in voice recognition technology. ** Final report electronically signed by Dr. Rosy Talley on 6/19/2022 8:06 PM      CT Cervical Spine WO Contrast   Final Result   1. No fracture or spondylolisthesis of the cervical spine. 2. Multilevel degenerative disc disease, neural foraminal narrowing and central canal stenosis.       Final report electronically signed by Dr. Rosy Talley on 6/19/2022 8:08 PM      VL DUP LOWER EXTREMITY VENOUS BILATERAL    (Results Pending)   CT ABDOMEN PELVIS WO CONTRAST Additional Contrast? None    (Results Pending)       Laboratory work-up significant for      ED COURSE   ED Medications administered this visit:   Medications   sodium chloride flush 0.9 % injection 10 mL (has no administration in time range)   sodium chloride flush 0.9 % injection 10 mL (has no administration in time range)   0.9 % sodium chloride infusion (has no administration in time range)   ondansetron (ZOFRAN-ODT) disintegrating tablet 4 mg (has no administration in time range)     Or   ondansetron (ZOFRAN) injection 4 mg (has no administration in time range)   polyethylene glycol (GLYCOLAX) packet 17 g (has no administration in time range)   acetaminophen (TYLENOL) tablet 650 mg (has no administration in time range)     Or   acetaminophen (TYLENOL) suppository 650 mg (has no administration in time range)   aspirin EC tablet 81 mg (has no administration in time range)   atorvastatin (LIPITOR) tablet 20 mg (has no administration in time range)   metoprolol tartrate (LOPRESSOR) tablet 25 mg (25 mg Oral Given 6/20/22 0314)   levoFLOXacin (LEVAQUIN) 750 MG/150ML infusion 750 mg (750 mg IntraVENous New Bag 6/20/22 0311)   ipratropium-albuterol (DUONEB) nebulizer solution 1 ampule (has no administration in time range)   guaiFENesin-dextromethorphan (ROBITUSSIN DM) 100-10 MG/5ML syrup 5 mL (5 mLs Oral Given 6/20/22 0314)   iopamidol (ISOVUE-370) 76 % injection 80 mL (80 mLs IntraVENous Given 6/19/22 2049)   furosemide (LASIX) injection 20 mg (20 mg IntraVENous Given 6/20/22 0311)   potassium chloride (KLOR-CON M) extended release tablet 40 mEq (40 mEq Oral Given 6/20/22 0315)     Laboratory work-up shows BNP 2200, bilirubin 1.5 but LFTs otherwise unremarkable, normal TSH, normal troponin, no leukocytosis, normal hemoglobin. EKG shows new onset atrial fibrillation the patient does not appear to have a history of this in the past patient with diffuse T wave inversions but no ST segment elevation, rate 89, , QTc 428, rightward axis. D-dimer was elevated. Patient is COVID-positive. CT head and C-spine negative for acute process. Chest and pelvis x-ray negative for acute process. CTA chest obtained due to elevated D-dimer, new onset A. fib, and new COVID infection and was negative for PE or other acute process. Given patient's recurrent falls, new onset A. fib, generalized weakness, will admit to hospitalist for further management. MEDICATION CHANGES     Current Discharge Medication List            FINAL DISPOSITION     Final diagnoses:   COVID-19   Atrial fibrillation, unspecified type ([facility] [address]     Condition: condition: stable  Dispo: Admit to telemetry      This transcription was electronically signed. It was dictated by use of voice recognition software and electronically transcribed. The transcription may contain errors not detected in proofreading.        Claudell Monday, MD  06/20/22 [id number]

## 2022-06-20 NOTE — PROGRESS NOTES
Hospitalist Progress Note    Patient:  Kamryn Julian      Unit/Bed:4K-08/008-A    YOB: 1932    MRN: [id number]       Acct: [de-identified]     PCP: Corin Marina DO    Date of Admission: 6/19/2022    Assessment/Plan:    Acute COVID-19 pneumonia without hypoxemia: Covid (+) on 6/19/22. CRP 2.99, D-dimer 2,427. O2 Sat 94% on RA. CTA chest (6/19) negative PE, (-) bilateral LEs DVT. 6/20: Plan: cont supportive care. No indication for steroid or Baricitinib (also family refuse steroid). Dc abx due to afebrile and no sign of bacterial pneumonia with (-) Strep, (-) legionella. New onset A fib without RVR: [id number] 2. 2/2 COVID infection vs volume depletion vs electrolyte abnormalities. TSH wnl 2.74. Mg 2.1 with K + 3.8 on admission. Plan: cont Tele, keep Mg > 2, K+ > 4, BMP daily, cont follow up with Dr. Carlota Harmon outpatient. Elevate Troponin: 2/2 demand ischemia vs hypoxia from COVID pneumonia. Trop 0.015 (6/20/22) Denied any chest pain, chest pressure. EKG (6/20/22) sinus tachycardia with PAC, short refill episode. Plan: will order EKG and trend if patient develop new symptom. Nausea, vomiting: Resolved. 2/2 covid infection. Cont supportive care. Encourage oral intake. Kidney function wnl. Will hold off NS since patient tolerate oral diet well    General weakness: 2/2 acute COVID pneumonia. Consult PT/OT. Patient may need home health on discharged. 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 appearence at his baseline per family. Will continue     Moderate hiatal hernia: incidental finding on CT A & P (6/20/22). Asymptomatic    Right renal cyst: Incidental finding on CT A & P (6/20/22). Cr wnl and at baseline. Asymptomatic. Plan: will referred to Nephrologist if worsening kidney function.  Cont monitor for now    Expected discharge date:  6/21/22    Disposition:    [x] Home       [] TCU       [] Rehab       [] Psych       [] SNF       [] Long BMI 24.16 kg/m²     General appearance: No apparent distress, appears stated age and cooperative. HEENT: Pupils equal, round, and reactive to light. Conjunctivae/corneas clear. Neck: Supple, with full range of motion. No jugular venous distention. Trachea midline. Respiratory:  Normal respiratory effort. Clear to auscultation, bilaterally without Rales/Wheezes/Rhonchi. Cardiovascular: Regular rate and rhythm with normal S1/S2 without murmurs, rubs or gallops. Abdomen: Soft, non-tender, non-distended with normal bowel sounds. Musculoskeletal: passive and active ROM x 4 extremities. Unsteady gait. Couldn't be able to ambulate from bed to chair. Skin: Skin color, texture, turgor normal.  No rashes or lesions. Neurologic:  Neurovascularly intact without any focal sensory/motor deficits. Cranial nerves: II-XII intact, grossly non-focal.  Psychiatric: Alert and oriented, thought content appropriate, normal insight  Capillary Refill: Brisk,< 3 seconds   Peripheral Pulses: +2 palpable, equal bilaterally       Labs:   Recent Labs     06/19/22 1900 06/20/22  1325   WBC 7.8 7.1   HGB 16.3 15.1   HCT 50.3 46.1    177     Recent Labs     06/19/22  1900 06/19/22  2312 06/20/22  1325     --  134*   K 3.8  --  4.4   CL 96*  --  98   CO2 26  --  26   BUN 10  --  12   CREATININE 1.0  --  1.0   CALCIUM 10.0  --  9.0   PHOS  --  3.4  --      Recent Labs     06/19/22 1900   AST 31   ALT 17   BILITOT 1.5*   ALKPHOS 85     No results for input(s): INR in the last 72 hours. No results for input(s): Shanna Leatha in the last 72 hours. Microbiology:      Urinalysis:      Lab Results   Component Value Date    NITRU NEGATIVE 06/20/2022    BLOODU NEGATIVE 06/20/2022    GLUCOSEU NEGATIVE 06/20/2022       Radiology:  CT ABDOMEN PELVIS WO CONTRAST Additional Contrast? None   Final Result      1. Moderate-sized hiatal hernia. 2. Small gallstones.    3. 3.3 cm right renal cyst.   4. Probable old granulomatous disease in the spleen. 5. Slightly atrophic pancreas. 6. Enlarged prostate gland. 7. Atherosclerotic calcification in the abdominal aorta, iliac and visceral arteries. 8. Degenerative changes involving the lumbar spine, hip and sacroiliac joints bilaterally. 9.. Mild cardiomegaly. Coronary artery calcification versus stent placement. Calcification in the aortic root. **This report has been created using voice recognition software. It may contain minor errors which are inherent in voice recognition technology. **      Final report electronically signed by DR Chandan Aragon on 6/20/2022 10:38 AM      VL DUP LOWER EXTREMITY VENOUS BILATERAL   Final Result   No evidence of a DVT. **This report has been created using voice recognition software. It may contain minor errors which are inherent in voice recognition technology. **      Final report electronically signed by DR Chandan Aragon on 6/20/2022 9:03 AM      CTA CHEST W WO CONTRAST - r/o Pulmonary Embolism   Final Result   Impression:      No evidence of pulmonary embolus. This document has been electronically signed by: Indira Erazo MD on    06/19/2022 09:09 PM      All CTs at this facility use dose modulation techniques and iterative    reconstructions, and/or weight-based dosing   when appropriate to reduce radiation to a low as reasonably achievable. XR PELVIS (1-2 VIEWS)   Final Result      No pelvic fracture. Final report electronically signed by Dr. Bernadette Hirsch on 6/19/2022 8:10 PM      XR CHEST 1 VIEW   Final Result   Impression:      No acute processes. This document has been electronically signed by: Indira Erazo MD on    06/19/2022 09:08 PM      CT Head WO Contrast   Final Result   1. No mass effect or acute hemorrhage. 2. Chronic periventricular small vessel ischemic changes and cerebral atrophy. **This report has been created using voice recognition software.  It may contain minor errors which are inherent in voice recognition technology. **      Final report electronically signed by Dr. Reilly Bello on 6/19/2022 8:06 PM      CT Cervical Spine WO Contrast   Final Result   1. No fracture or spondylolisthesis of the cervical spine. 2. Multilevel degenerative disc disease, neural foraminal narrowing and central canal stenosis.       Final report electronically signed by Dr. Reilly Bello on 6/19/2022 8:08 PM          DVT prophylaxis: [x] Lovenox                                 [] SCDs                                 [] SQ Heparin                                 [] Encourage ambulation           [] Already on Anticoagulation     Code Status: Limited    PT/OT Eval Status: 6/20/22    Tele:   [x] yes             [] no    Electronically signed by Silverio Mir DO on 6/20/2022 at 4:19 PM

## 2022-06-20 NOTE — ED NOTES
Patient resting in bed. Respirations easy and unlabored. No distress noted. Call light within reach.   Pt and family updated on POC, awaiting IP orders and bed assignment      Beatriz Holbrook RN  06/19/22 [id number]

## 2022-06-21 LAB
ANION GAP SERPL CALCULATED.3IONS-SCNC: 10 MEQ/L (ref 8–16)
BUN BLDV-MCNC: 16 MG/DL (ref 7–22)
CALCIUM SERPL-MCNC: 9.2 MG/DL (ref 8.5–10.5)
CHLORIDE BLD-SCNC: 100 MEQ/L (ref 98–111)
CO2: 27 MEQ/L (ref 23–33)
CREAT SERPL-MCNC: 1.1 MG/DL (ref 0.4–1.2)
EKG ATRIAL RATE: 74 BPM
EKG P AXIS: 24 DEGREES
EKG P-R INTERVAL: 164 MS
EKG Q-T INTERVAL: 438 MS
EKG QRS DURATION: 160 MS
EKG QTC CALCULATION (BAZETT): 486 MS
EKG R AXIS: 91 DEGREES
EKG T AXIS: -59 DEGREES
EKG VENTRICULAR RATE: 74 BPM
ERYTHROCYTE [DISTWIDTH] IN BLOOD BY AUTOMATED COUNT: 13.2 % (ref 11.5–14.5)
ERYTHROCYTE [DISTWIDTH] IN BLOOD BY AUTOMATED COUNT: 45.1 FL (ref 35–45)
GFR SERPL CREATININE-BSD FRML MDRD: 63 ML/MIN/1.73M2
GLUCOSE BLD-MCNC: 102 MG/DL (ref 70–108)
HCT VFR BLD CALC: 46.3 % (ref 42–52)
HEMOGLOBIN: 14.6 GM/DL (ref 14–18)
LEGIONELLA PNEUMOPHILIA AG, URINE: NEGATIVE
MCH RBC QN AUTO: 29.4 PG (ref 26–33)
MCHC RBC AUTO-ENTMCNC: 31.5 GM/DL (ref 32.2–35.5)
MCV RBC AUTO: 93.3 FL (ref 80–94)
PLATELET # BLD: 164 THOU/MM3 (ref 130–400)
PMV BLD AUTO: 9.8 FL (ref 9.4–12.4)
POTASSIUM SERPL-SCNC: 4.6 MEQ/L (ref 3.5–5.2)
RBC # BLD: 4.96 MILL/MM3 (ref 4.7–6.1)
SODIUM BLD-SCNC: 137 MEQ/L (ref 135–145)
STREP PNEUMO AG, UR: NEGATIVE
WBC # BLD: 7.3 THOU/MM3 (ref 4.8–10.8)

## 2022-06-21 PROCEDURE — 97162 PT EVAL MOD COMPLEX 30 MIN: CPT

## 2022-06-21 PROCEDURE — 97530 THERAPEUTIC ACTIVITIES: CPT

## 2022-06-21 PROCEDURE — 87205 SMEAR GRAM STAIN: CPT

## 2022-06-21 PROCEDURE — 93010 ELECTROCARDIOGRAM REPORT: CPT | Performed by: NUCLEAR MEDICINE

## 2022-06-21 PROCEDURE — 97166 OT EVAL MOD COMPLEX 45 MIN: CPT

## 2022-06-21 PROCEDURE — 80048 BASIC METABOLIC PNL TOTAL CA: CPT

## 2022-06-21 PROCEDURE — 85027 COMPLETE CBC AUTOMATED: CPT

## 2022-06-21 PROCEDURE — 93005 ELECTROCARDIOGRAM TRACING: CPT | Performed by: STUDENT IN AN ORGANIZED HEALTH CARE EDUCATION/TRAINING PROGRAM

## 2022-06-21 PROCEDURE — 6370000000 HC RX 637 (ALT 250 FOR IP): Performed by: INTERNAL MEDICINE

## 2022-06-21 PROCEDURE — 36415 COLL VENOUS BLD VENIPUNCTURE: CPT

## 2022-06-21 PROCEDURE — 97535 SELF CARE MNGMENT TRAINING: CPT

## 2022-06-21 PROCEDURE — 6360000002 HC RX W HCPCS: Performed by: STUDENT IN AN ORGANIZED HEALTH CARE EDUCATION/TRAINING PROGRAM

## 2022-06-21 PROCEDURE — 99233 SBSQ HOSP IP/OBS HIGH 50: CPT | Performed by: HOSPITALIST

## 2022-06-21 PROCEDURE — 1200000003 HC TELEMETRY R&B

## 2022-06-21 PROCEDURE — 87070 CULTURE OTHR SPECIMN AEROBIC: CPT

## 2022-06-21 RX ORDER — POLYETHYLENE GLYCOL 3350 17 G/17G
17 POWDER, FOR SOLUTION ORAL DAILY PRN
Qty: 527 G | Refills: 1 | Status: CANCELLED | OUTPATIENT
Start: 2022-06-21 | End: 2022-07-21

## 2022-06-21 RX ORDER — IPRATROPIUM BROMIDE AND ALBUTEROL SULFATE 2.5; .5 MG/3ML; MG/3ML
3 SOLUTION RESPIRATORY (INHALATION) EVERY 4 HOURS PRN
Qty: 360 ML | Refills: 1 | Status: CANCELLED | OUTPATIENT
Start: 2022-06-21

## 2022-06-21 RX ORDER — ATORVASTATIN CALCIUM 20 MG/1
20 TABLET, FILM COATED ORAL DAILY
Qty: 30 TABLET | Refills: 3 | Status: CANCELLED | OUTPATIENT
Start: 2022-06-21

## 2022-06-21 RX ADMIN — ASPIRIN 81 MG: 81 TABLET, COATED ORAL at 08:16

## 2022-06-21 RX ADMIN — ENOXAPARIN SODIUM 40 MG: 100 INJECTION SUBCUTANEOUS at 21:04

## 2022-06-21 RX ADMIN — ATORVASTATIN CALCIUM 20 MG: 20 TABLET, FILM COATED ORAL at 08:18

## 2022-06-21 RX ADMIN — METOPROLOL TARTRATE 25 MG: 50 TABLET, FILM COATED ORAL at 08:16

## 2022-06-21 RX ADMIN — METOPROLOL TARTRATE 25 MG: 50 TABLET, FILM COATED ORAL at 21:04

## 2022-06-21 ASSESSMENT — PAIN SCALES - GENERAL: PAINLEVEL_OUTOF10: 0

## 2022-06-21 NOTE — FLOWSHEET NOTE
06/21/22 1214   Safe Environment   Safety Measures Other (comment)  (Virtual safety round complete)   Pt sitting up in bed eating lunch, family at bedside, call light within reach, will continue to monitor.

## 2022-06-21 NOTE — PROGRESS NOTES
[facility]  INPATIENT PHYSICAL THERAPY  EVALUATION  [facility] ICU STEPDOWN TELEMETRY 4K - 4K-08/008-A    Time In: 6440  Time Out: 1212  Timed Code Treatment Minutes: 15 Minutes  Minutes: 28          Date: 2022  Patient Name: Stacie Christiansen,  Gender:  male        MRN: [id number]  : 3/22/1932  (80 y.o.)      Referring Practitioner: Georgia Gayle DO  Diagnosis: Physical debility  Additional Pertinent Hx: Per chart review, \"Logan Isidro is a 80 y.o. male with a history of CAD, hypercholesterolemia, arthritis, possible dementia who presents to the emergency department for evaluation of multiple falls. Patient lost his wife to cancer 5 days ago and over the past few days has had increasing cough, multiple falls, generalized weakness, 1 episode of nonbloody nonbilious emesis, and \"heartburn\". Patient denies any symptoms at the time of evaluation but per family members, he has had significantly increasing cough, difficulty ambulating, and multiple falls where they are unsure if he hit his head but was mostly lowered to the ground. Patient has had increased incontinence. Per family at bedside they do not believe he has dementia though this had been previously documented. They state that patient is at his mental baseline at this time but is just very weak. He takes aspirin but is not on any other blood thinners. The patient has no other acute complaints at this time. \"     Restrictions/Precautions:  Restrictions/Precautions: General Precautions,Fall Risk,Other (comment)  Position Activity Restriction  Other position/activity restrictions: fluid restriction    Subjective:  Chart Reviewed: Yes  Patient assessed for rehabilitation services?: Yes  Subjective: Pt resting in recliner with daughter (POA) present and helpful with information. RN approved session.     General:     Vision: Impaired  Vision Exceptions: Wears glasses for reading  Hearing: Exceptions to [facility]  Hearing Exceptions: Hard of hearing/hearing concerns       Pain: denies      Vitals: Vitals not assessed per clinical judgement, see nursing flowsheet    Social/Functional History:    Lives With: Alone  Type of Home: House  Home Layout: Multi-level  Home Access: Stairs to enter with rails  Entrance Stairs - Number of Steps: 3-4 + 1 + 1  Home Equipment: [employer] Shower/Tub: Walk-in shower  Bathroom Toilet: Handicap height  Bathroom Equipment: Grab bars in shower  Bathroom Accessibility: Accessible    Receives Help From: Family  ADL Assistance: Needs assistance  Toileting: Needs assistance  Homemaking Assistance: Needs assistance  Homemaking Responsibilities: Yes  Ambulation Assistance: Independent  Transfer Assistance: Independent    Active : No  Mode of Transportation: Car  Occupation: Retired  Additional Comments: Family considering to take him to daughter's home instead due to distances to have to get in/out of his own home. OBJECTIVE:  Range of Motion:  Bilateral Lower Extremity: WFL    Strength:  Bilateral Lower Extremity: grossly 4-/5    Balance:  Static Sitting Balance:  Supervision  Dynamic Sitting Balance: Stand By Assistance  Static Standing Balance: Contact Guard Assistance  Dynamic Standing Balance: Contact Guard Assistance, Minimal Assistance, with cane    Bed Mobility:  Sit to Supine: Stand By Assistance, with head of bed raised, with rail, with increased time for completion     Transfers:  Sit to Stand: Contact Guard Assistance  Stand to [employer] Assistance    Ambulation:  Contact Guard Assistance, Minimal Assistance, x 1 to 2  Distance: 80 ft  Surface: Level Tile  Device:Cane  Gait Deviations: Forward Flexed Posture, Decreased Step Length Bilaterally, Decreased Gait Speed, Decreased Heel Strike Bilaterally, Narrow Base of Support and Unsteady Gait    Exercise:  Patient was guided in 1 set(s) 10 reps of exercise to both lower extremities. Seated marches, Seated heel/toe raises and Long arc quads.   Exercises were completed for increased independence with functional mobility. Functional Outcome Measures: Completed  AM-PAC Inpatient Mobility Raw Score : 17  AM-PAC Inpatient T-Scale Score : 42.13    ASSESSMENT:  Activity Tolerance:  Patient tolerance of  treatment: good. Treatment Initiated: Treatment and education initiated within context of evaluation. Evaluation time included review of current medical information, gathering information related to past medical, social and functional history, completion of standardized testing, formal and informal observation of tasks, assessment of data and development of plan of care and goals. Treatment time included skilled education and facilitation of tasks to increase safety and independence with functional mobility for improved independence and quality of life. Assessment: Body Structures, Functions, Activity Limitations Requiring Skilled Therapeutic Intervention: Decreased functional mobility ,Decreased strength,Decreased balance,Decreased coordination,Decreased posture,Increased pain  Assessment: Pt is a 79 yo male who is recently  (spouse passed 1 week ago). Pt had been Mod I with cane until this illness per daughter's report. Pt currently needing CGA to light Min A x 1 for safety with ambulation. Pt would benefit from continued skilled PT to address strengthening, balance, and functional mobility safety. Therapy Prognosis: Good    Requires PT Follow-Up: Yes    Discharge Recommendations:  Discharge Recommendations: Continue to assess pending progress,24 hour supervision or assist,Therapy recommended at discharge,Patient would benefit from continued therapy after discharge    Patient Education:     Barriers to Learning: hearing.     Patient Education  Education Given To: Patient  Education Provided: Role of Therapy,Plan of Care  Education Method: Demonstration,Verbal  Barriers to Learning: Hearing  Education Outcome: Verbalized understanding,Demonstrated understanding,Continued education needed       Equipment Recommendations: Other: monitor for needs    Plan:  Current Treatment Recommendations: Strengthening,Balance training,Endurance training,Functional mobility training,Transfer training,Gait training,Stair training,Therapeutic activities,Patient/Caregiver education & training,Safety education & training,Home exercise program,Equipment evaluation, education, & procurement  Plan:  (5x C/GM)    Goals:  Patient goals : go home  Short Term Goals  Time Frame for Short term goals: at discharge  Short term goal 1: Pt to be Mod I for supine <> sit to get in/out of bed  Short term goal 2: Pt to be Supervision for sit <> stand to get up to ambulate  Short term goal 3: Pt to be SBA to ambulate >100 ft with cane for household distances  Short term goal 4: Pt to negotiate 4 steps with 1 rail and cane with CGA for home access  Long Term Goals  Time Frame for Long term goals : not set due to short ELOS    Following session, patient left in safe position with all fall risk precautions in place. Win Moody.  Milton Kapoor, [place] [address]

## 2022-06-21 NOTE — FLOWSHEET NOTE
06/21/22 1457   Safe Environment   Safety Measures Other (comment)  (Virtual safety round complete)   Pt lying in bed, family and staff member at bedside, call light is within reach, will continue to monitor.

## 2022-06-21 NOTE — CARE COORDINATION
6/21/22, 2:00 PM EDT    DISCHARGE PLANNING EVALUATION    Met with patient and family and they prefer [facility] and then [facility] as second choice. Left a message to make referral.  Patient may be staying with daughter so home health will need contact Jossie to set up appointments. Referral made to Jim Dacosta at [facility].

## 2022-06-21 NOTE — PLAN OF CARE
Problem: Discharge Planning  Goal: Discharge to home or other facility with appropriate resources  Outcome: Progressing    Discharge to home or other facility with appropriate resources:   Identify barriers to discharge with patient and caregiver   Arrange for needed discharge resources and transportation as appropriate   Identify discharge learning needs (meds, wound care, etc)   Arrange for interpreters to assist at discharge as needed   Refer to discharge planning if patient needs post-hospital services based on physician order or complex needs related to functional status, cognitive ability or social support system  Note: Communication with treatment team and patient about discharge plan and appropriate resources. Problem: Pain  Goal: Verbalizes/displays adequate comfort level or baseline comfort level  Outcome: Progressing    Verbalizes/displays adequate comfort level or baseline comfort level:   Encourage patient to monitor pain and request assistance   Assess pain using appropriate pain scale   Administer analgesics based on type and severity of pain and evaluate response   Implement non-pharmacological measures as appropriate and evaluate response   Consider cultural and social influences on pain and pain management   Notify Licensed Independent Practitioner if interventions unsuccessful or patient reports new pain  Note: Patient displays improved well-being such as baseline levels for pulse, BP, respirations and relaxed muscle tone or body posture. Monitor using 0-10 pain scale. Problem: Safety - Adult  Goal: Free from fall injury  Outcome: Progressing  Note: The patient has been assessed for falls and the room room has been assessed for hazards to safety. Hand hygeine has been performed upon additional hazards to their safety. Will continue to monitor and assess throughout my hourly rounding.        Problem: Skin/Tissue Integrity - Adult  Goal: Skin integrity remains intact  Outcome: Progressing    Skin Integrity Remains Intact:   Monitor for areas of redness and/or skin breakdown   Assess vascular access sites hourly  Note: No new signs of injury or skin breakdown noted. Hourly rounding performed. Skin integrity, elimination, circulation assessed assessed hourly. Will continue to monitor.

## 2022-06-21 NOTE — PROGRESS NOTES
Hospitalist Progress Note    Patient:  Kamryn Julian      Unit/Bed:4K-08/008-A    YOB: 1932    MRN: [id number]       Acct: [de-identified]     PCP: Corin Marina DO    Date of Admission: 6/19/2022    Assessment/Plan:    Acute COVID-19 pneumonia without hypoxemia: Covid (+) on 6/19/22. CRP 2.99, D-dimer 2,427. O2 Sat 94% on RA. CTA chest (6/19) negative PE, (-) bilateral LEs DVT. 6/20: Plan: cont supportive care. No indication for steroid or Baricitinib (also family refuse steroid). Dc abx due to afebrile and no sign of bacterial pneumonia with (-) Strep, (-) legionella. 6/21: Doing well with supportive care. On RA with O2 Sat 97%. New onset A fib RVR: Resolved. [id number] 2. 2/2 COVID infection vs volume depletion vs electrolyte abnormalities. TSH wnl 2.74. Mg 2.1 with K + 3.8 on admission. Plan: cont Tele, keep Mg > 2, K+ > 4, BMP daily, cont follow up with Dr. Carlota Harmon outpatient. 6/21: EKG showed NSR with PAC. Cont Tele, keep Mg > 2, K+ > 4, BMP daily, cont follow up with Dr. Carlota Harmon outpatient. Dr. Mirta Wills discuss with family about [employer], giving risk out weight risk. Family agreed not start [employer] and will follow up with Dr. Carlota Harmon as out patient    Elevate Troponin: 2/2 demand ischemia vs hypoxia from COVID pneumonia. Trop 0.015 (6/20/22) Denied any chest pain, chest pressure. EKG (6/20/22) sinus tachycardia with PAC, short A. fib episode. Plan: will order EKG and trend if patient develop new symptom. Nausea, vomiting: Resolved. 2/2 covid infection. Cont supportive care. Encourage oral intake. Kidney function wnl. Will hold off NS since patient tolerate oral diet well    Diarrhea: Resolved. 2/2 COVID infections. 3 loose BMs on the night of admission. No recent abx use. Live at home. (-) C. Diff toxin and antigen. General weakness: 2/2 acute COVID pneumonia. Consult PT/OT. Patient may need home health on discharged.     Mechanical fall: CT head, Xray chest, Xray pelvic (6/19) negative finding, 2/2 general weakness. PT/OT has been following. Home Health on discharged. Dementia w/o behavior disturbances: live at home by himself (wife pass away last week). Appearence at his baseline per family. Moderate hiatal hernia: incidental finding on CT A & P (6/20/22). Asymptomatic    Right renal cyst: Incidental finding on CT A & P (6/20/22). Cr wnl and at baseline. Asymptomatic. Plan: will referred to Nephrologist if worsening kidney function. Cont monitor for now    Expected discharge date:  6/21/22    Disposition:    [x] Home       [] TCU       [] Rehab       [] Psych       [] SNF       [] [facility]       [] Other-    Chief Complaint: General weakness, productive cough, nausea and vomiting for 3 days. Hospital Course: Harmony Aponte is 80years old male who presented to the hospital for general weakness, productive cough, nausea vomiting for 3-day. Past medical history significant of HTN, hyperlipidemia. Per family report, patient's wife passing away on 6/15/22. Patient has been very poor oral intake seen his wife passing away. Over the last 3 days patient start developed nausea, vomiting and weakness. Denied any fevers, chills, SOB. In the ED, blood pressure elevated with 156/89 with . O2 Sat 98 on RA. Covid (+). Patient was admited and managed for acute COVID-pneumonia, A. fib with RVR, general weakness. Subjective (past 24 hours): No event overnight. Patient reported back to his normal baseline. Denied any chest pain, pressure in his chest, nausea, vomiting, SOB, dizziness. He has been working with PT/OT. Patient's daughter at the bed side was explained about discharge planning. Patient's family would like to discharged home with home health. Refuse nursing home. ROS (12 point review of systems completed. Pertinent positives noted. Otherwise ROS is negative).     Medications:  Reviewed    Infusion Medications    sodium chloride       Scheduled Medications    enoxaparin  40 mg SubCUTAneous Q24H    sodium chloride flush  10 mL IntraVENous 2 times per day    aspirin  81 mg Oral Daily    atorvastatin  20 mg Oral Daily    metoprolol tartrate  25 mg Oral BID     PRN Meds: ipratropium-albuterol, guaiFENesin-dextromethorphan, potassium chloride **OR** potassium alternative oral replacement **OR** potassium chloride, potassium chloride, sodium chloride flush, sodium chloride, ondansetron **OR** ondansetron, polyethylene glycol, acetaminophen **OR** acetaminophen      Intake/Output Summary (Last 24 hours) at 6/21/2022 1615  Last data filed at 6/21/2022 1455  Gross per 24 hour   Intake 590 ml   Output 350 ml   Net 240 ml       Diet:  ADULT DIET; Regular; Low Sodium (2 gm); 1800 ml  ADULT ORAL NUTRITION SUPPLEMENT; Breakfast, Lunch, Dinner; Frozen Oral Supplement    Exam:  /68   Pulse 92   Temp 97.2 °F (36.2 °C) (Oral)   Resp 16   Ht 5' 10\" (1.778 m)   Wt 168 lb 6.4 oz (76.4 kg)   SpO2 97%   BMI 24.16 kg/m²     General appearance: No apparent distress, appears stated age and cooperative. HEENT: Pupils equal, round, and reactive to light. Conjunctivae/corneas clear. Neck: Supple, with full range of motion. No jugular venous distention. Trachea midline. Respiratory:  Normal respiratory effort. Clear to auscultation, bilaterally without Rales/Wheezes/Rhonchi. Cardiovascular: Regular rate and rhythm with normal S1/S2 without murmurs, rubs or gallops. Abdomen: Soft, non-tender, non-distended with normal bowel sounds. Musculoskeletal: passive and active ROM x 4 extremities. Unsteady gait. Couldn't be able to ambulate from bed to chair. Skin: Skin color, texture, turgor normal.  No rashes or lesions. Neurologic:  Neurovascularly intact without any focal sensory/motor deficits.  Cranial nerves: II-XII intact, grossly non-focal.  Psychiatric: Alert and oriented, thought content appropriate, normal insight  Capillary Refill: Brisk,< 3 seconds   Peripheral Pulses: +2 palpable, equal bilaterally       Labs:   Recent Labs     06/19/22  1900 06/20/22  1325 06/21/22  0550   WBC 7.8 7.1 7.3   HGB 16.3 15.1 14.6   HCT 50.3 46.1 46.3    177 164     Recent Labs     06/19/22  1900 06/19/22  2312 06/20/22  1325 06/21/22  0550     --  134* 137   K 3.8  --  4.4 4.6   CL 96*  --  98 100   CO2 26  --  26 27   BUN 10  --  12 16   CREATININE 1.0  --  1.0 1.1   CALCIUM 10.0  --  9.0 9.2   PHOS  --  3.4  --   --      Recent Labs     06/19/22 1900   AST 31   ALT 17   BILITOT 1.5*   ALKPHOS 85     No results for input(s): INR in the last 72 hours. No results for input(s): Faby Cosme in the last 72 hours. Microbiology:      Urinalysis:      Lab Results   Component Value Date    NITRU NEGATIVE 06/20/2022    BLOODU NEGATIVE 06/20/2022    GLUCOSEU NEGATIVE 06/20/2022       Radiology:  CT ABDOMEN PELVIS WO CONTRAST Additional Contrast? None   Final Result      1. Moderate-sized hiatal hernia. 2. Small gallstones. 3. 3.3 cm right renal cyst.   4. Probable old granulomatous disease in the spleen. 5. Slightly atrophic pancreas. 6. Enlarged prostate gland. 7. Atherosclerotic calcification in the abdominal aorta, iliac and visceral arteries. 8. Degenerative changes involving the lumbar spine, hip and sacroiliac joints bilaterally. 9.. Mild cardiomegaly. Coronary artery calcification versus stent placement. Calcification in the aortic root. **This report has been created using voice recognition software. It may contain minor errors which are inherent in voice recognition technology. **      Final report electronically signed by DR Isreal Andrews on 6/20/2022 10:38 AM      VL DUP LOWER EXTREMITY VENOUS BILATERAL   Final Result   No evidence of a DVT. **This report has been created using voice recognition software. It may contain minor errors which are inherent in voice recognition technology. **      Final report electronically signed by DR Isreal Andrews on 6/20/2022 9:03 AM      CTA CHEST W WO CONTRAST - r/o Pulmonary Embolism   Final Result   Impression:      No evidence of pulmonary embolus. This document has been electronically signed by: Jessica Akbar MD on    06/19/2022 09:09 PM      All CTs at this facility use dose modulation techniques and iterative    reconstructions, and/or weight-based dosing   when appropriate to reduce radiation to a low as reasonably achievable. XR PELVIS (1-2 VIEWS)   Final Result      No pelvic fracture. Final report electronically signed by Dr. Karis White on 6/19/2022 8:10 PM      XR CHEST 1 VIEW   Final Result   Impression:      No acute processes. This document has been electronically signed by: Jessica Akbar MD on    06/19/2022 09:08 PM      CT Head WO Contrast   Final Result   1. No mass effect or acute hemorrhage. 2. Chronic periventricular small vessel ischemic changes and cerebral atrophy. **This report has been created using voice recognition software. It may contain minor errors which are inherent in voice recognition technology. **      Final report electronically signed by Dr. Karis White on 6/19/2022 8:06 PM      CT Cervical Spine WO Contrast   Final Result   1. No fracture or spondylolisthesis of the cervical spine. 2. Multilevel degenerative disc disease, neural foraminal narrowing and central canal stenosis.       Final report electronically signed by Dr. Karis White on 6/19/2022 8:08 PM          DVT prophylaxis: [x] Lovenox                                 [] SCDs                                 [] SQ Heparin                                 [] Encourage ambulation           [] Already on Anticoagulation     Code Status: Limited    PT/OT Eval Status: 6/20/22    Tele:   [x] yes             [] no    Electronically signed by Sowmya Leal DO on 6/21/2022 at 4:15 PM

## 2022-06-21 NOTE — PROGRESS NOTES
[facility] ICU STEPDOWN TELEMETRY 4K  EVALUATION    Time:   Time In: 6100  Time Out: 1009  Timed Code Treatment Minutes: 39 Minutes  Minutes: 51          Date: 2022  Patient Name: Dhaval Leonard,   Gender: male      MRN: [id number]  : 3/22/1932  (80 y.o.)  Referring Practitioner: Keke Rico DO  Diagnosis: COVID-19  Additional Pertinent Hx: Per chart review, \"Logan Ramirez is a 80 y.o. male with a history of CAD, hypercholesterolemia, arthritis, possible dementia who presents to the emergency department for evaluation of multiple falls. Patient lost his wife to cancer 5 days ago and over the past few days has had increasing cough, multiple falls, generalized weakness, 1 episode of nonbloody nonbilious emesis, and \"heartburn\". Patient denies any symptoms at the time of evaluation but per family members, he has had significantly increasing cough, difficulty ambulating, and multiple falls where they are unsure if he hit his head but was mostly lowered to the ground. Patient has had increased incontinence. Per family at bedside they do not believe he has dementia though this had been previously documented. They state that patient is at his mental baseline at this time but is just very weak. He takes aspirin but is not on any other blood thinners. The patient has no other acute complaints at this time. \"    Restrictions/Precautions:  Restrictions/Precautions: General Precautions,Fall Risk,Other (comment)  Position Activity Restriction  Other position/activity restrictions: fluid restriction    Subjective  Chart Reviewed: Monique Crow and Physical  Patient assessed for rehabilitation services?: Yes  Family / Caregiver Present: Yes (two daughters)    Subjective: RN approved session. Patient supine in bed upon OT arrival, two daughters present and patient agreeable to OT session.  Daughter reporting concern with home discharge today and patient care, writer provided extensive support and education. Pain: No pain reported. Vitals: Oxygen: Patient on room air, O2 remained >90%. Social/Functional History:  Lives With: Alone  Type of Home: House  Home Layout: Multi-level  Home Access: Stairs to enter with rails  Home Equipment: Cane   Bathroom Shower/Tub: Walk-in shower  Bathroom Toilet: Handicap height  Bathroom Equipment: Grab bars in shower  Bathroom Accessibility: Accessible    Receives Help From: Family  ADL Assistance: Needs assistance  [address]: Needs assistance  Homemaking Responsibilities: Yes  Ambulation Assistance: Independent  Transfer Assistance: Independent    Active : No  Patient's  Info: Patients family members  Mode of Transportation: Car  Occupation: Retired       VISION:WFL    HEARING:  Hard of hearing    COGNITION: Slow Processing, Decreased Problem Solving, Decreased Safety Awareness and Difficulty Following Commands    RANGE OF MOTION:  Bilateral Upper Extremity:  WFL    STRENGTH:  Bilateral Upper Extremity:  WFL    SENSATION:   WFL    ADL:   Grooming: Minimal Assistance and with set-up. patient sat in recliner to apply deodorant and use mouth wash. Upper Extremity Dressing: Minimal Assistance. to doff/steven hospital gown. Lower Extremity Dressing: Stand By Assistance. to doff/steven slipper sock. Kathy Martinez BALANCE:  Sitting Balance:  Stand By Assistance. while seated EOB and in recliner. Standing Balance: Contact Guard Assistance. with 2w/w. BED MOBILITY:  Supine to Sit: Contact Guard Assistance with verbal cues for hand placement and technique. TRANSFERS:  Sit to Stand:  Contact Guard Assistance. two trials. first trial from EOB, verbal cues for hand placement and technique and second attempt from recliner, verbal cues for hand placement and technique. Stand to Sit: Contact Guard Assistance. to sit onto recliner.     FUNCTIONAL MOBILITY:  Assistive Device: Rolling Walker  Assist Level:  Contact Guard Assistance. Distance: small lap in hospital room from EOB to recliner. Patient demo flexed posture, unsteady gait and tended to shuffle his feet. Addition Activities:   Patient daughter expressing concern regarding home discharge and patient care, writer provided extensive support and education. Activity Tolerance:  Patient tolerance of  treatment: good. Patient cooperative in OT eval, patient hard of hearing and demo some SOB however O2 remained >90%. Assessment:  Assessment: Patient presents with decreased functioanl mobility, endurance, ADL status and safety awareness secondary to COVID-19 diagnosis and AFIB. Patient demo unsteady gait during functional mobility and tends to shuffle feet. Patient on room air and O2 remained >90% however patient did experience mild SOB and coughing. Patient would benefit from continued skilled OT to increase ease with functional mobility, ADLs and increase safety awareness and decrease caregiver burden to return to PLOF. Performance deficits / Impairments: Decreased functional mobility ,Decreased endurance,Decreased ADL status,Decreased safe awareness  Prognosis: Fair  REQUIRES OT FOLLOW-UP: Yes  Decision Making: Medium Complexity    Treatment Initiated: Treatment and education initiated within context of evaluation. Evaluation time included review of current medical information, gathering information related to past medical, social and functional history, completion of standardized testing, formal and informal observation of tasks, assessment of data and development of plan of care and goals. Treatment time included skilled education and facilitation of tasks to increase safety and independence with ADL's for improved functional independence and quality of life.     Discharge Recommendations:  Continue to assess pending progress,Patient would benefit from continued therapy after discharge,Subacute/Skilled Nursing [address] [address] with OT    Patient Education:     Patient Education  Education Given To: Patient,Family  Education Provided: Role of Therapy,Plan of Care,Fall Prevention Strategies,Transfer Training,ADL Adaptive Strategies,Family Education  Education Provided Comments: Extensive education and support provided to daughter due to daughters concern with home discharge and patient care. Barriers to Learning: Hearing,Cognition    Equipment Recommendations:  Equipment Needed: Yes  Mobility Devices: ADL Assistive Devices  Other: continue to assess    Plan:  Times per Week: 5x  Times per Day: Daily  Current Treatment Recommendations: Functional mobility training,Endurance training,Equipment evaluation, education, & procurement,Patient/Caregiver education & training,Safety education & training,Self-Care / ADL. See long-term goal time frame for expected duration of plan of care. If no long-term goals established, a short length of stay is anticipated. Goals:  Patient goals : patient expressed goal to return home however daughter reported concern with this plan. Short Term Goals  Time Frame for Short term goals: upon discharge  Short Term Goal 1: Patient will participate in functional ambulation household distances with SBA to increase ease with toileting and grooming. Short Term Goal 2: Patient will tolerate 6 minutes of dynamic standing with SBA and 1-2 hand release to increase ease with dressing. Short Term Goal 3: Patient will complete ADL routine with supervision and AE and 1-2 verbal cues for energy conservation techniques. Following session, patient left in safe position with all fall risk precautions in place.

## 2022-06-22 VITALS
BODY MASS INDEX: 24.47 KG/M2 | TEMPERATURE: 98.1 F | HEIGHT: 70 IN | WEIGHT: 170.9 LBS | DIASTOLIC BLOOD PRESSURE: 86 MMHG | SYSTOLIC BLOOD PRESSURE: 120 MMHG | RESPIRATION RATE: 19 BRPM | OXYGEN SATURATION: 93 % | HEART RATE: 65 BPM

## 2022-06-22 PROBLEM — R53.1 GENERALIZED WEAKNESS: Status: ACTIVE | Noted: 2022-06-19

## 2022-06-22 PROBLEM — E44.0 MODERATE MALNUTRITION (HCC): Status: ACTIVE | Noted: 2022-06-22

## 2022-06-22 LAB
ALBUMIN SERPL-MCNC: 3.6 G/DL (ref 3.5–5.1)
ALP BLD-CCNC: 60 U/L (ref 38–126)
ALT SERPL-CCNC: 19 U/L (ref 11–66)
ANION GAP SERPL CALCULATED.3IONS-SCNC: 7 MEQ/L (ref 8–16)
AST SERPL-CCNC: 33 U/L (ref 5–40)
BILIRUB SERPL-MCNC: 0.7 MG/DL (ref 0.3–1.2)
BILIRUBIN DIRECT: < 0.2 MG/DL (ref 0–0.3)
BUN BLDV-MCNC: 13 MG/DL (ref 7–22)
CALCIUM SERPL-MCNC: 8.9 MG/DL (ref 8.5–10.5)
CHLORIDE BLD-SCNC: 97 MEQ/L (ref 98–111)
CO2: 29 MEQ/L (ref 23–33)
CREAT SERPL-MCNC: 0.9 MG/DL (ref 0.4–1.2)
EKG ATRIAL RATE: 81 BPM
EKG P AXIS: 47 DEGREES
EKG P-R INTERVAL: 162 MS
EKG Q-T INTERVAL: 438 MS
EKG QRS DURATION: 162 MS
EKG QTC CALCULATION (BAZETT): 508 MS
EKG R AXIS: 85 DEGREES
EKG T AXIS: -63 DEGREES
EKG VENTRICULAR RATE: 81 BPM
ERYTHROCYTE [DISTWIDTH] IN BLOOD BY AUTOMATED COUNT: 13.1 % (ref 11.5–14.5)
ERYTHROCYTE [DISTWIDTH] IN BLOOD BY AUTOMATED COUNT: 44.4 FL (ref 35–45)
GFR SERPL CREATININE-BSD FRML MDRD: 79 ML/MIN/1.73M2
GLUCOSE BLD-MCNC: 100 MG/DL (ref 70–108)
HCT VFR BLD CALC: 44.3 % (ref 42–52)
HEMOGLOBIN: 13.9 GM/DL (ref 14–18)
MCH RBC QN AUTO: 29.1 PG (ref 26–33)
MCHC RBC AUTO-ENTMCNC: 31.4 GM/DL (ref 32.2–35.5)
MCV RBC AUTO: 92.9 FL (ref 80–94)
PLATELET # BLD: 160 THOU/MM3 (ref 130–400)
PMV BLD AUTO: 10 FL (ref 9.4–12.4)
POTASSIUM SERPL-SCNC: 4.3 MEQ/L (ref 3.5–5.2)
RBC # BLD: 4.77 MILL/MM3 (ref 4.7–6.1)
SODIUM BLD-SCNC: 133 MEQ/L (ref 135–145)
TOTAL PROTEIN: 5.8 G/DL (ref 6.1–8)
TROPONIN T: < 0.01 NG/ML
WBC # BLD: 4.9 THOU/MM3 (ref 4.8–10.8)

## 2022-06-22 PROCEDURE — 80053 COMPREHEN METABOLIC PANEL: CPT

## 2022-06-22 PROCEDURE — 85027 COMPLETE CBC AUTOMATED: CPT

## 2022-06-22 PROCEDURE — 99239 HOSP IP/OBS DSCHRG MGMT >30: CPT | Performed by: FAMILY MEDICINE

## 2022-06-22 PROCEDURE — 84484 ASSAY OF TROPONIN QUANT: CPT

## 2022-06-22 PROCEDURE — 93010 ELECTROCARDIOGRAM REPORT: CPT | Performed by: NUCLEAR MEDICINE

## 2022-06-22 PROCEDURE — 97110 THERAPEUTIC EXERCISES: CPT

## 2022-06-22 PROCEDURE — 36415 COLL VENOUS BLD VENIPUNCTURE: CPT

## 2022-06-22 PROCEDURE — 97535 SELF CARE MNGMENT TRAINING: CPT

## 2022-06-22 PROCEDURE — 93270 REMOTE 30 DAY ECG REV/REPORT: CPT

## 2022-06-22 PROCEDURE — 6370000000 HC RX 637 (ALT 250 FOR IP): Performed by: INTERNAL MEDICINE

## 2022-06-22 PROCEDURE — 93005 ELECTROCARDIOGRAM TRACING: CPT | Performed by: FAMILY MEDICINE

## 2022-06-22 PROCEDURE — 82248 BILIRUBIN DIRECT: CPT

## 2022-06-22 RX ORDER — GUAIFENESIN/DEXTROMETHORPHAN 100-10MG/5
5 SYRUP ORAL EVERY 4 HOURS PRN
Qty: 120 ML | Refills: 0 | Status: SHIPPED | OUTPATIENT
Start: 2022-06-22 | End: 2022-07-02

## 2022-06-22 RX ADMIN — METOPROLOL TARTRATE 25 MG: 50 TABLET, FILM COATED ORAL at 09:13

## 2022-06-22 RX ADMIN — ATORVASTATIN CALCIUM 20 MG: 20 TABLET, FILM COATED ORAL at 09:13

## 2022-06-22 RX ADMIN — ASPIRIN 81 MG: 81 TABLET, COATED ORAL at 09:14

## 2022-06-22 NOTE — CARE COORDINATION
6/22/22, 10:25 AM EDT    Patient goals/plan/ treatment preferences discussed by  and . Patient goals/plan/ treatment preferences reviewed with patient/ family. Patient/ family verbalize understanding of discharge plan and are in agreement with goal/plan/treatment preferences. Understanding was demonstrated using the teach back method. AVS provided by RN at time of discharge, which includes all necessary medical information pertaining to the patients current course of illness, treatment, post-discharge goals of care, and treatment preferences. Services At/After Discharge: [address] [place] [address]    Patient to discharge home with daughter Sergo Pratt and [facility]. SW notified [facility] of discharge today. RN made aware.        IMM Letter  IMM Letter given to Patient/Family/Significant other/Guardian/POA/by[de-identified] Patient Access  IMM Letter date given[de-identified] 06/19/22  IMM Letter time given[de-identified] 2091

## 2022-06-22 NOTE — PROGRESS NOTES
Patient and sister reviewed discharge orders. Daughter will  Rx downstaiirs. Waiting on another daughter to transport home.

## 2022-06-22 NOTE — FLOWSHEET NOTE
Attempted to speak with patient for safety round, however family at bedside states patient is sleeping and does not want me to bother patient

## 2022-06-22 NOTE — DISCHARGE SUMMARY
Hospital Medicine Discharge Summary      Patient Identification:   Kalyani Michelle   : 3/22/1932  MRN: [id number]   Account: [de-identified]      Patient's PCP: Ella Alvarado DO    Admit Date: 2022     Discharge Date:   2022    Admitting Physician: Rodrigo Palma MD     Discharge Physician: Conrado Price DO     Discharge Diagnoses: The patient was seen and examined on day of discharge and this discharge summary is in conjunction with any daily progress note from day of discharge. COVID-19  New onset paroxysmal A. Fib  Generalized weakness  Chronic HFrEF  Severe AS  CAD  Dementia      Hospital Course:   Kalyani Michelle is a 80 y.o. male admitted to [address] [place] [address] on 2022 for generalized weakness, productive cough, nausea and vomiting for 3 days. Patient's family also stated he had a fall. Past medical history is significant for hypertension hyperlipidemia, CAD, dementia. Patient recently had his wife passed away from cancer on 6/15. Per family patient had been having a very poor oral intake. Patient was discovered to be COVID-positive in the ED however oxygen saturations were 98% on room air. Patient's arrival EKG also showed atrial fibrillation however was rate controlled. Patient was admitted for generalized weakness, A. fib, and COVID. He was initially treated for possible COVID-pneumonia with Levaquin however patient had normal white count, on room air, comfortable, and afebrile. Levaquin was discontinued due to no indication. Patient repeat EKG showed patient had gone back into sinus rhythm. No prior history of atrial fibrillation known. Discussed with family about anticoagulant. Patient and family elected not to do anticoagulants due to patient being high risk at falls. Patient is already on Lopressor 25 mg daily for rate control. Was never in RVR during hospitalization. TSH was normal.  Echo was done which showed EF of 45% along with severe AAS.   Unknown of prior EF function however patient does follow with his cardiologist and will follow up with him outpatient with these findings. Options for severe AS were also discussed such as SAVR or TAVR however patient and family elected not to do any surgery or pursue further work-up. Patient tolerated therapy well while inpatient with PT and OT. Will go home with family along with home health for PT and OT. Exam:     Vitals:  Vitals:    06/21/22 2315 06/22/22 0400 06/22/22 0906 06/22/22 1225   BP: 131/64 127/66 137/66 120/86   Pulse: 67 63 66 65   Resp: 16 18 19 19   Temp: 97.9 °F (36.6 °C) 98.9 °F (37.2 °C) 99 °F (37.2 °C) 98.1 °F (36.7 °C)   TempSrc: Oral Oral Oral Oral   SpO2: 94% 94% 93% 93%   Weight:  170 lb 14.4 oz (77.5 kg)     Height:         Weight: Weight: 170 lb 14.4 oz (77.5 kg)     24 hour intake/output:    Intake/Output Summary (Last 24 hours) at 6/22/2022 1305  Last data filed at 6/22/2022 0400  Gross per 24 hour   Intake 1340 ml   Output --   Net 1340 ml         General appearance:  No apparent distress, appears stated age and cooperative. HEENT:  Normal cephalic, atraumatic without obvious deformity. Pupils equal, round, and reactive to light. Extra ocular muscles intact. Conjunctivae/corneas clear. Neck: Supple, with full range of motion. No jugular venous distention. Trachea midline. Respiratory:  Normal respiratory effort. Clear to auscultation, bilaterally without Rales/Wheezes/Rhonchi. Cardiovascular:  Regular rate and rhythm with normal S1/S2 without rubs or gallops. 2/6 murmur heard on R sternal border  Abdomen: Soft, non-tender, non-distended with normal bowel sounds. Musculoskeletal:  No clubbing, cyanosis or edema bilaterally. Full range of motion without deformity. Skin: Skin color, texture, turgor normal.  No rashes or lesions. Neurologic:  Neurovascularly intact without any focal sensory/motor deficits.  Cranial nerves: II-XII intact, grossly non-focal.  Psychiatric:  Alert and oriented, thought content appropriate, normal insight  Capillary Refill: Brisk,< 3 seconds   Peripheral Pulses: +2 palpable, equal bilaterally       Labs: For convenience and continuity at follow-up the following most recent labs are provided:      CBC:    Lab Results   Component Value Date    WBC 4.9 06/22/2022    HGB 13.9 06/22/2022    HCT 44.3 06/22/2022     06/22/2022       Renal:    Lab Results   Component Value Date     06/22/2022    K 4.3 06/22/2022    K 3.8 06/19/2022    CL 97 06/22/2022    CO2 29 06/22/2022    BUN 13 06/22/2022    CREATININE 0.9 06/22/2022    CALCIUM 8.9 06/22/2022    PHOS 3.4 06/19/2022         Significant Diagnostic Studies    Radiology:   CT ABDOMEN PELVIS WO CONTRAST Additional Contrast? None   Final Result      1. Moderate-sized hiatal hernia. 2. Small gallstones. 3. 3.3 cm right renal cyst.   4. Probable old granulomatous disease in the spleen. 5. Slightly atrophic pancreas. 6. Enlarged prostate gland. 7. Atherosclerotic calcification in the abdominal aorta, iliac and visceral arteries. 8. Degenerative changes involving the lumbar spine, hip and sacroiliac joints bilaterally. 9.. Mild cardiomegaly. Coronary artery calcification versus stent placement. Calcification in the aortic root. **This report has been created using voice recognition software. It may contain minor errors which are inherent in voice recognition technology. **      Final report electronically signed by DR Robles Schumacher on 6/20/2022 10:38 AM      VL DUP LOWER EXTREMITY VENOUS BILATERAL   Final Result   No evidence of a DVT. **This report has been created using voice recognition software. It may contain minor errors which are inherent in voice recognition technology. **      Final report electronically signed by DR Robles Schumacher on 6/20/2022 9:03 AM      CTA CHEST W WO CONTRAST - r/o Pulmonary Embolism   Final Result   Impression:      No evidence of pulmonary embolus. This document has been electronically signed by: Von Durand MD on    06/19/2022 09:09 PM      All CTs at this facility use dose modulation techniques and iterative    reconstructions, and/or weight-based dosing   when appropriate to reduce radiation to a low as reasonably achievable. XR PELVIS (1-2 VIEWS)   Final Result      No pelvic fracture. Final report electronically signed by Dr. Georgie Napier on 6/19/2022 8:10 PM      XR CHEST 1 VIEW   Final Result   Impression:      No acute processes. This document has been electronically signed by: Von Durand MD on    06/19/2022 09:08 PM      CT Head WO Contrast   Final Result   1. No mass effect or acute hemorrhage. 2. Chronic periventricular small vessel ischemic changes and cerebral atrophy. **This report has been created using voice recognition software. It may contain minor errors which are inherent in voice recognition technology. **      Final report electronically signed by Dr. Georgie Napier on 6/19/2022 8:06 PM      CT Cervical Spine WO Contrast   Final Result   1. No fracture or spondylolisthesis of the cervical spine. 2. Multilevel degenerative disc disease, neural foraminal narrowing and central canal stenosis. Final report electronically signed by Dr. Georgie Napier on 6/19/2022 8:08 PM             Consults:     IP CONSULT TO SOCIAL WORK  IP CONSULT TO SPIRITUAL SERVICES  IP CONSULT TO SOCIAL WORK  IP CONSULT TO HOME CARE NEEDS  IP CONSULT TO HOME CARE NEEDS    Disposition: Home  Condition at Discharge: Stable    Code Status:  Limited     Patient Instructions:    Discharge lab work: Activity: activity as tolerated  Diet: ADULT DIET; Regular;  Low Sodium (2 gm); 1800 ml  ADULT ORAL NUTRITION SUPPLEMENT; Breakfast, Lunch, Dinner; Frozen Oral Supplement      Follow-up visits:   Elida Rodriguez DO  [address]  [phone]           316 [facility]/Catia Toro [address]  [phone]    As needed for nursing, therapy, aidrajinder Burnett DO  250 [facility]  [facility]  [phone]    On 7/12/2022  at 2:30 pm , he maybe able to see at home office will let you know    [address] [facility], [address] [facility] MELANIE  [place] [address].[facility] [address] [phone]      post hospital follow up         Discharge Medications:        Medication List      START taking these medications    guaiFENesin-dextromethorphan 100-10 MG/5ML syrup  Commonly known as: ROBITUSSIN DM  Take 5 mLs by mouth every 4 hours as needed for Cough        CONTINUE taking these medications    Co Q10 100 MG Caps     Ecotrin 325 MG EC tablet  Generic drug: aspirin     GARLIC PO     LIPITOR PO     LOPRESSOR PO     magnesium oxide 400 (240 Mg) MG tablet  Commonly known as: MAG-OX     MILK THISTLE EXTRACT PO           Where to Get Your Medications      These medications were sent to [address] [place] [address] , [address] [facility] 1st 3 [facility]  [address] [place]  [facility] [address] [facility] [address]    Phone: [phone]   · guaiFENesin-dextromethorphan 100-10 MG/5ML syrup         Time Spent on discharge is more than 30 minutes in the examination, evaluation, counseling and review of medications and discharge plan. Signed: Thank you Octavio Marina DO for the opportunity to be involved in this patient's care.     Electronically signed by Lebron Couch DO on 6/22/2022 at 1:05 PM

## 2022-06-22 NOTE — PROGRESS NOTES
[facility] ICU STEPDOWN TELEMETRY 4K  Occupational Therapy  Daily Note  Time:    Time In: 9191  Time Out: 1150  Timed Code Treatment Minutes: 44 Minutes  Minutes: 39          Date: 2022  Patient Name: Elida Dexter,   Gender: male      Room: [facility]/008-A  MRN: [id number]  : 3/22/1932  (80 y.o.)  Referring Practitioner: Tejal Malhotra DO  Diagnosis: COVID-19  Additional Pertinent Hx: Per chart review, \"Logan Sandoval is a 80 y.o. male with a history of CAD, hypercholesterolemia, arthritis, possible dementia who presents to the emergency department for evaluation of multiple falls. Patient lost his wife to cancer 5 days ago and over the past few days has had increasing cough, multiple falls, generalized weakness, 1 episode of nonbloody nonbilious emesis, and \"heartburn\". Patient denies any symptoms at the time of evaluation but per family members, he has had significantly increasing cough, difficulty ambulating, and multiple falls where they are unsure if he hit his head but was mostly lowered to the ground. Patient has had increased incontinence. Per family at bedside they do not believe he has dementia though this had been previously documented. They state that patient is at his mental baseline at this time but is just very weak. He takes aspirin but is not on any other blood thinners. The patient has no other acute complaints at this time. \"    Restrictions/Precautions:  Restrictions/Precautions: General Precautions,Fall Risk,Other (comment)  Position Activity Restriction  Other position/activity restrictions: fluid restriction, contact + isolation for COVID 0    SUBJECTIVE: Per family, Pt will be going home with daughter & she will provide 24hr A    PAIN: no c/o pain during session/10:      Vitals: Oxygen: 98% on room air    COGNITION: Slow Processing, Decreased Safety Awareness and very [place] difficult to fully assess cognition    ADL:   Toileting: Contact Guard Assistance.      Toilet Transfer: [employer]. STS with 1 grab bar   . **Pt declined further ADLs at this time. BALANCE:  Sitting Balance:  Stand By Assistance. Standing Balance: Contact Guard Assistance. -min A unsteadiness noted    BED MOBILITY:  Supine to Sit: Stand By Assistance with HOB elevated & using bedrail    TRANSFERS:  Sit to Stand:  Contact Guard Assistance. from EOB; education for UE placement  Stand to Sit: [employer]. to recliner education on UE placement    FUNCTIONAL MOBILITY:  Assistive Device: Straight Cane  Assist Level:  Contact Guard Assistance. Distance: To  bathroom  From bathroom required occassional min A d/t unsteadiness & noted increased LE weakness. Therapist discussed this with Pt & daughters in room-therapist recommend us of RW when this occurs even though Pt & daughter somewhat resistant to using RW. Therapist also educated family on recommendation of A at all times when up & use of gait belt. Family reports they have access to RW, [facility], & will obtain shower chair with back. ADDITIONAL ACTIVITIES:  Pt was educated on deep breathing while standing in mirror working on posture, standing x 2 min    ASSESSMENT:     Activity Tolerance:  Patient tolerance of  treatment: fair.         Discharge Recommendations: 24 hour assistance or supervision and Home with Home Health OT  Equipment Recommendations: Equipment Needed: Yes  Mobility Devices: ADL Assistive Devices  Other: continue to assess  Plan: Times per Week: 5x  Times per Day: Daily  Current Treatment Recommendations: Functional mobility training,Endurance training,Equipment evaluation, education, & procurement,Patient/Caregiver education & training,Safety education & training,Self-Care / ADL    Patient Education  Patient Education: see above    Goals  Short Term Goals  Time Frame for Short term goals: upon discharge  Short Term Goal 1: Patient will participate in functional ambulation household distances with SBA to increase ease with toileting and grooming. Short Term Goal 2: Patient will tolerate 6 minutes of dynamic standing with SBA and 1-2 hand release to increase ease with dressing. Short Term Goal 3: Patient will complete ADL routine with supervision and AE and 1-2 verbal cues for energy conservation techniques. Following session, patient left in safe position with all fall risk precautions in place.

## 2022-06-22 NOTE — PROGRESS NOTES
unable to determine / Unknown  -- Refer to Clinical Documentation Reviewer    PROVIDER RESPONSE TEXT:    Provider disagreed with this query.   acute CHF ruled out and has Chronic diastolic CHF    Query created by: Zonia Kilpatrick on 6/22/2022 12:43 PM      Electronically signed by:  Samantha CUEVAS MD 6/22/2022 2:54 PM

## 2022-06-22 NOTE — PROGRESS NOTES
Comprehensive Nutrition Assessment    Type and Reason for Visit:  Initial,Positive Nutrition Screen (unplanned wt loss, poor po/appetite)    Nutrition Recommendations/Plan:   1. Continue current diet. 2. Send magic cup TID. 3. Consider MVI as appropriate. Malnutrition Assessment:  Malnutrition Status: Moderate malnutrition (06/22/22 2083)    Context:  Chronic Illness     Findings of the 6 clinical characteristics of malnutrition:  Energy Intake:  Unable to assess (0-50% atleast 2 days)  Weight Loss:     Unable to assess (no previous wts in EMR)   Body Fat Loss:   (moderate) Orbital,Fat Overlying Ribs   Muscle Mass Loss:   (moderate) Temples (temporalis),Clavicles (pectoralis & deltoids)  Fluid Accumulation:  Unable to assess     Strength:  Not Performed    Nutrition Assessment:      Pt. moderately malnourished AEB criteria as listed above. At risk for further nutrition compromise r/t admit with Physical Debility, Tested + for Covid 19 (6/19/22),Dementia, Acute CHF Exacerbation, Moderate Sized Hiatal Hernia, Small Gallstones related to Renal Cyst and underlying medical condition (Hx CAD, Dementia, Hypercholesterolemia). Nutrition Related Findings:    Pt. Report/Treatments/Miscellaneous: Pt seen with daughter ~1100 (6/21). Pt from home & daughter from [place] visiting. Pt appears advanced age, cachexic, very [place]. Unable to obtain diet hx. Pt mentions pt tends to skip breakfast at home. Pt denies difficulty chewing/swallowing foods. Pertinent Labs:(6/22) Na 133  Pertinent Meds: Zofran, Glycolax  No BM    Wound Type: None       Current Nutrition Intake & Therapies:    Average Meal Intake: 0%,1-25%,26-50%  Average Supplements Intake:  (new)  ADULT DIET; Regular;  Low Sodium (2 gm); 1800 ml  ADULT ORAL NUTRITION SUPPLEMENT; Breakfast, Lunch, Dinner; Frozen Oral Supplement    Anthropometric Measures:  Height: 5' 10\" (177.8 cm)  Ideal Body Weight (IBW): 166 lbs (75 kg)    Admission Body Weight: 160 lb (72.6 kg) ((6/19))  Current Body Weight: 160 lb (72.6 kg) ((6/19)),       Current BMI (kg/m2): 23  Usual Body Weight:  (no prevous wts in EMR)                       BMI Categories: Normal Weight (BMI 22.0 to 24.9) age over 72    Estimated Daily Nutrient Needs:  Energy Requirements Based On: Kcal/kg     Energy (kcal/day): 7903-6626 kcals (25-30kcals/kgm)     Protein (g/day):  grams (1.2-2 grams protein/kgm)       Nutrition Diagnosis:   · In context of chronic illness,Moderate malnutrition related to cognitive or neurological impairment,cardiac dysfunction as evidenced by Criteria as identified in malnutrition assessment      Nutrition Interventions:   Food and/or Nutrient Delivery: Continue Current Diet,Start Oral Nutrition Supplement  Nutrition Education/Counseling: Education initiated (Encouraged good oral intake & ONS intake best effort)  Coordination of Nutrition Care: Continue to monitor while inpatient       Goals:     Goals: PO intake 75% or greater,by next RD assessment       Nutrition Monitoring and Evaluation:   Behavioral-Environmental Outcomes: Other (Comment) (very [place])  Food/Nutrient Intake Outcomes: Diet Advancement/Tolerance,Food and Nutrient Intake,Supplement Intake  Physical Signs/Symptoms Outcomes: Biochemical Data,GI Status,Fluid Status or Edema,Hemodynamic Status,Nutrition Focused Physical Findings,Skin,Weight    Discharge Planning:     Too soon to determine     Lucía Castañeda RD, LD  Contact: [phone]

## 2022-06-23 ENCOUNTER — CARE COORDINATION (OUTPATIENT)
Dept: CASE MANAGEMENT | Age: 87
End: 2022-06-23

## 2022-06-23 LAB
GRAM STAIN RESULT: NORMAL
RESPIRATORY CULTURE: NORMAL

## 2022-06-23 NOTE — CARE COORDINATION
Covid-19 Initial Follow Up Call    Patient contacted regarding COVID-19 risk, exposure and diagnosis. Discussed COVID-19 related testing which was available at this time. Test results were positive. Patient informed of results, if available? Yes. Care Transition Nurse contacted the family by telephone to perform post discharge assessment. Call within 2 business days of discharge: Yes. Verified name and  with family as identifiers. Provided introduction to self, and explanation of the CTN/ACM role, and reason for call due to risk factors for infection and/or exposure to COVID-19. Symptoms reviewed with family who verbalized the following symptoms: cough  no new symptoms  no worsening symptoms. Due to no new or worsening symptoms encounter was not routed to provider for escalation. Spoke with daughter, Alem Disla, said her dad, Villa Lord is doing ok. Denies fever, SOB, chest pain/palpitations, does have a cough, not always able to bring up the phlegm. Was not given IS or Acapella, encouraged deep breathing/coughing exercises. His daughter has a background in nursing. Reviewed medications/no changes but she was not happy that he was only getting 20 mg of Lipitor in the hospital when should have been 80 mg per Dr Amna Hobbs. No meds are to be changed without his approval.  Updated the med list.  His wife passed recently and is staying with Alem Disla right now. He has a pet parrot for 30 yrs and had been alone for a few days, brought to her house last night and they both had their breakfast together this morning. His appetite and fluid intake is good. Wearing a 14 day cardiac monitor and will f/u with Dr Amna Hobbs but has no openings and is on a cancellation list.  Confirmed [facility] referral with Rosemary. Has a f/u with PCP next month. Denies needs. No other questions or concerns at this time. Will continue to follow.   There were some issues with his care while in the hospital that she has relayed to the unit manager, Andrzej Shields. Discussed follow-up appointments. If no appointment was previously scheduled, appointment scheduling offered: Yes. [facility] follow up appointment(s): No future appointments. [facility] follow up appointment(s): PCP 7/12    Non-face-to-face services provided:  Scheduled appointment with PCP-7/12  Scheduled appointment with Specialist-Dr Johnathan Booth office to call  Obtained and reviewed discharge summary and/or continuity of care documents     Advance Care Planning:   Does patient have an Advance Directive:  reviewed and current. Educated patient about risk for severe COVID-19 due to risk factors according to CDC guidelines. CTN reviewed discharge instructions, medical action plan and red flag symptoms with the family who verbalized understanding. Discussed COVID vaccination status: Yes. Education provided on COVID-19 vaccination as appropriate. Discussed exposure protocols and quarantine with CDC Guidelines. Family was given an opportunity to verbalize any questions and concerns and agrees to contact CTN or health care provider for questions related to their healthcare. Reviewed and educated family on any new and changed medications related to discharge diagnosis     Was patient discharged with a pulse oximeter? No       CTN provided contact information. Plan for follow-up call in 5-7 days based on severity of symptoms and risk factors.

## 2022-06-25 LAB
BLOOD CULTURE, ROUTINE: NORMAL
BLOOD CULTURE, ROUTINE: NORMAL

## 2022-06-27 NOTE — PROGRESS NOTES
CLINICAL PHARMACY NOTE: MEDS TO BEDS    Total # of Prescriptions Filled: 1   The following medications were delivered to the patient:  Siltussin dm syrup    Additional Documentation:

## 2022-06-29 ENCOUNTER — CARE COORDINATION (OUTPATIENT)
Dept: CASE MANAGEMENT | Age: 87
End: 2022-06-29

## 2022-07-08 ENCOUNTER — OFFICE VISIT (OUTPATIENT)
Dept: CARDIOLOGY CLINIC | Age: 87
End: 2022-07-08
Payer: MEDICARE

## 2022-07-08 VITALS
RESPIRATION RATE: 14 BRPM | HEART RATE: 88 BPM | SYSTOLIC BLOOD PRESSURE: 116 MMHG | DIASTOLIC BLOOD PRESSURE: 64 MMHG | OXYGEN SATURATION: 97 %

## 2022-07-08 DIAGNOSIS — E78.5 DYSLIPIDEMIA (HIGH LDL; LOW HDL): ICD-10-CM

## 2022-07-08 DIAGNOSIS — E78.00 HYPERCHOLESTEROLEMIA: Primary | ICD-10-CM

## 2022-07-08 PROCEDURE — G8420 CALC BMI NORM PARAMETERS: HCPCS | Performed by: INTERNAL MEDICINE

## 2022-07-08 PROCEDURE — 1111F DSCHRG MED/CURRENT MED MERGE: CPT | Performed by: INTERNAL MEDICINE

## 2022-07-08 PROCEDURE — 1123F ACP DISCUSS/DSCN MKR DOCD: CPT | Performed by: INTERNAL MEDICINE

## 2022-07-08 PROCEDURE — 1036F TOBACCO NON-USER: CPT | Performed by: INTERNAL MEDICINE

## 2022-07-08 PROCEDURE — 93000 ELECTROCARDIOGRAM COMPLETE: CPT | Performed by: INTERNAL MEDICINE

## 2022-07-08 PROCEDURE — 99213 OFFICE O/P EST LOW 20 MIN: CPT | Performed by: INTERNAL MEDICINE

## 2022-07-08 PROCEDURE — G8427 DOCREV CUR MEDS BY ELIG CLIN: HCPCS | Performed by: INTERNAL MEDICINE

## 2022-07-08 RX ORDER — TAMSULOSIN HYDROCHLORIDE 0.4 MG/1
CAPSULE ORAL
COMMUNITY
Start: 2022-06-27 | End: 2022-09-20 | Stop reason: SDUPTHER

## 2022-07-08 RX ORDER — METOPROLOL TARTRATE 50 MG/1
25 TABLET, FILM COATED ORAL DAILY
Qty: 60 TABLET | Refills: 3 | Status: SHIPPED | OUTPATIENT
Start: 2022-07-08

## 2022-07-08 RX ORDER — ATORVASTATIN CALCIUM 80 MG/1
80 TABLET, FILM COATED ORAL DAILY
Qty: 90 TABLET | Refills: 3 | Status: CANCELLED | OUTPATIENT
Start: 2022-07-08

## 2022-07-08 RX ORDER — ATORVASTATIN CALCIUM 80 MG/1
TABLET, FILM COATED ORAL
COMMUNITY
Start: 2022-06-27 | End: 2022-08-02

## 2022-07-08 ASSESSMENT — ENCOUNTER SYMPTOMS
COUGH: 0
COLOR CHANGE: 0
ABDOMINAL DISTENTION: 0
CHOKING: 0
APNEA: 0
WHEEZING: 0
BLOOD IN STOOL: 0
ABDOMINAL PAIN: 0
CHEST TIGHTNESS: 0
VOMITING: 0
STRIDOR: 0
TROUBLE SWALLOWING: 0
VOICE CHANGE: 0
ANAL BLEEDING: 0
NAUSEA: 0
SHORTNESS OF BREATH: 0

## 2022-07-08 NOTE — PROCEDURES
[address] [id number]                                 EVENT MONITOR    PATIENT NAME: Babs Lr                      :        1932  MED REC NO:   [id number]                           ROOM:       0008  ACCOUNT NO:   [de-identified]                           ADMIT DATE: 2022  PROVIDER:     Jessica Garcia M.D.    TEST TYPE:  Event monitor. CLINICAL HISTORY AND INDICATION:  This is a patient with AFib. EVENT MONITOR DESCRIPTION:  Event monitor was attached to the patient  between 2022 and 2022. EVENT MONITOR FINDINGS:  Baseline rhythm showed sinus rhythm with right  bundle-branch block. There were short episodes of wide complex  tachycardia. Could be atrial fibrillation; however, cannot completely  exclude nonsustained ventricular tachycardia. CONCLUSION:  1. Sinus rhythm with right bundle-branch block. 2.  Short episodes of wide complex tachycardia. Differential includes  atrial fibrillation versus nonsustained ventricular tachycardia that  were short lasting. 3.  No pauses. 4.  Clinical correlation is recommended.         Rock Benavides M.D.    D: 2022 7:05:02       T: 2022 7:07:30     [id number]  Job#: [id number]     Doc#: [id number]    CC:

## 2022-07-08 NOTE — PROGRESS NOTES
[address] [facility]  Dept: [phone]  Loc: [phone]     7/8/2022       Bridger Hernández is here today for   Chief Complaint   Patient presents with    Follow-up     patient is here for f/u from [facility]           Referring Physician:  No ref. provider found     Patient Active Problem List   Diagnosis    Coronary artery disease involving native coronary artery of native heart without angina pectoris    Arthritis    Hypercholesterolemia    COVID-19    Generalized weakness    Asymptomatic cholelithiasis    Non-intractable vomiting with nausea    Atrial fibrillation, chronic (HCC)    Acute congestive heart failure (HCC)    Moderate malnutrition (HCC)    PAF (paroxysmal atrial fibrillation) (HCC)    RBBB    Enlarged prostate    Hiatal hernia    Severe aortic stenosis    Chronic combined systolic and diastolic CHF (congestive heart failure) ([address]    Dementia without behavioral disturbance ([address]       Review of Systems   Constitutional: Negative for activity change, appetite change, fatigue, fever and unexpected weight change. HENT: Positive for hearing loss. Negative for congestion, trouble swallowing and voice change. Eyes: Negative for visual disturbance. Respiratory: Negative for apnea, cough, choking, chest tightness, shortness of breath, wheezing and stridor. Cardiovascular: Positive for palpitations. Negative for chest pain and leg swelling. Gastrointestinal: Negative for abdominal distention, abdominal pain, anal bleeding, blood in stool, nausea and vomiting. Endocrine: Negative for cold intolerance and heat intolerance. Genitourinary: Negative for hematuria. Musculoskeletal: Negative for arthralgias, gait problem, joint swelling and myalgias. Skin: Negative for color change and rash. Allergic/Immunologic: Negative for environmental allergies and food allergies. Neurological: Negative for dizziness, tremors, syncope, facial asymmetry, weakness, light-headedness, numbness and headaches. Hematological: Does not bruise/bleed easily. Psychiatric/Behavioral: Negative for agitation, behavioral problems and sleep disturbance. Past Medical History:   Diagnosis Date    Arthritis     CAD (coronary artery disease)     Dementia ([facility]     Hypercholesterolemia        Allergies   Allergen Reactions    Pcn [Penicillins]        Current Outpatient Medications   Medication Sig Dispense Refill    atorvastatin (LIPITOR) 80 MG tablet       tamsulosin (FLOMAX) 0.4 MG capsule TAKE 1 CAPSULE BY MOUTH EVERY DAY      metoprolol tartrate (LOPRESSOR) 50 MG tablet Take 0.5 tablets by mouth daily 60 tablet 3    GARLIC PO Take 9,859 mg by mouth daily       magnesium oxide (MAG-OX) 400 (240 Mg) MG tablet Take 400 mg by mouth daily 2 tabs daily      Atorvastatin Calcium (LIPITOR PO) Take 80 mg by mouth nightly       Coenzyme Q10 (CO Q10) 100 MG CAPS Take 100 mg by mouth daily       MILK THISTLE EXTRACT PO Take 250 mg by mouth daily 2 capsules daily      aspirin (ECOTRIN) 325 MG EC tablet Take by mouth daily        No current facility-administered medications for this visit. Social History     Socioeconomic History    Marital status:      Spouse name: None    Number of children: None    Years of education: None    Highest education level: None   Occupational History    None   Tobacco Use    Smoking status: Never Smoker    Smokeless tobacco: Never Used   Substance and Sexual Activity    Alcohol use:  Yes     Alcohol/week: 14.0 standard drinks     Types: 14 Cans of beer per week    Drug use: Never    Sexual activity: None   Other Topics Concern    None   Social History Narrative    None     Social Determinants of Health     Financial Resource Strain:     Difficulty of Paying Living Expenses: Not on file   Food Insecurity:     Worried About Running Out of Food in the Last Year: Not on file    Ran Out of Food in the Last Year: Not on file   Transportation Needs:     Lack of Transportation (Medical): Not on file    Lack of Transportation (Non-Medical): Not on file   Physical Activity:     Days of Exercise per Week: Not on file    Minutes of Exercise per Session: Not on file   Stress:     Feeling of Stress : Not on file   Social Connections:     Frequency of Communication with Friends and Family: Not on file    Frequency of Social Gatherings with Friends and Family: Not on file    Attends Congregational Services: Not on file    Active Member of [address] [place] [address] or Organizations: Not on file    Attends Club or Organization Meetings: Not on file    Marital Status: Not on file   Intimate Partner Violence:     Fear of Current or Ex-Partner: Not on file    Emotionally Abused: Not on file    Physically Abused: Not on file    Sexually Abused: Not on file   Housing Stability:     Unable to Pay for Housing in the Last Year: Not on file    Number of [place] in the Last Year: Not on file    Unstable Housing in the Last Year: Not on file       History reviewed. No pertinent family history. Blood pressure 116/64, pulse 88, resp. rate 14, SpO2 97 %.     Physical Exam:    General Appearance: alert and oriented to person, place and time, in no acute distress  Cardiovascular: normal rate, regular rhythm, normal S1 and S2, no murmurs, rubs, clicks, or gallops, distal pulses intact, no carotid bruits, no JVD  Pulmonary/Chest: clear to auscultation bilaterally- no wheezes, rales or rhonchi, normal air movement, no respiratory distress  Abdomen: soft, non-tender, non-distended, normal bowel sounds, no masses   Extremities: no cyanosis, clubbing or edema, pulse   Skin: warm and dry, no rash or erythema  Head: normocephalic and atraumatic  Eyes: pupils equal, round, and reactive to light  Neck: supple and non-tender without mass, no thyromegaly   Musculoskeletal: normal range of motion, no joint swelling, deformity or tenderness  Neurological: alert, oriented, normal speech, no focal findings or movement disorder noted    Lab Data:    Cardiac Enzymes:  No results for input(s): CKTOTAL, CKMB, CKMBINDEX, TROPONINI in the last 72 hours. CBC:   Lab Results   Component Value Date/Time    WBC 4.9 06/22/2022 05:30 AM    RBC 4.77 06/22/2022 05:30 AM    HGB 13.9 06/22/2022 05:30 AM    HCT 44.3 06/22/2022 05:30 AM     06/22/2022 05:30 AM       CMP:    Lab Results   Component Value Date/Time     06/22/2022 05:30 AM    K 4.3 06/22/2022 05:30 AM    K 3.8 06/19/2022 07:00 PM    CL 97 06/22/2022 05:30 AM    CO2 29 06/22/2022 05:30 AM    BUN 13 06/22/2022 05:30 AM    CREATININE 0.9 06/22/2022 05:30 AM    LABGLOM 79 06/22/2022 05:30 AM    GLUCOSE 100 06/22/2022 05:30 AM    CALCIUM 8.9 06/22/2022 05:30 AM       Hepatic Function Panel:    Lab Results   Component Value Date/Time    ALKPHOS 60 06/22/2022 05:30 AM    ALT 19 06/22/2022 05:30 AM    AST 33 06/22/2022 05:30 AM    PROT 5.8 06/22/2022 05:30 AM    BILITOT 0.7 06/22/2022 05:30 AM    BILIDIR <0.2 06/22/2022 05:30 AM    LABALBU 3.6 06/22/2022 05:30 AM       Magnesium:    Lab Results   Component Value Date/Time    MG 2.1 06/19/2022 11:12 PM       PT/INR:  No results found for: PROTIME, INR    HgBA1c:    Lab Results   Component Value Date/Time    LABA1C 5.7 06/19/2022 11:12 PM       FLP:  No results found for: TRIG, HDL, LDLCALC, LDLDIRECT, LABVLDL    TSH:    Lab Results   Component Value Date/Time    TSH 2.740 06/19/2022 07:00 PM        Diagnosis Orders   1. Hypercholesterolemia  58714 - [place] ELECTROCARDIOGRAM, COMPLETE    metoprolol tartrate (LOPRESSOR) 50 MG tablet    CBC    Basic Metabolic Panel    Lipid Panel    Hepatic Function Panel   2.  Dyslipidemia (high LDL; low HDL)  metoprolol tartrate (LOPRESSOR) 50 MG tablet    CBC    Basic Metabolic Panel    Lipid Panel    Hepatic Function Panel        Assessment/Plan    Yusuf Harry is Edilson Ruts is 80 years old gentleman who is known to have history of coronary artery disease he was in the hospital with atrial for he has been on the metoprolol he has been on the aspirin we discussed with him and with his family about the anticoagulation patient does not want to take the Eliquis or any other anticoagulation he wants to continue with the aspirin. History of hypercholesterolemia with his age Lipitor is noted to be very helpful for him we will discontinue that. He does have a significant aortic stenosis I discussed with him and with his family the natural progression of the aortic stenosis and his prognosis he does not want to have anything done. He currently doing well wants to continue with the medical care medical treatment to monitor for anything any major procedures none he we will stop his Lipitor his EKG showed atrial flutter with controlled ventricular response he will continue with the aspirin continue metoprolol seen periodically seek medical attention for any change in clinical condition thank    Orders Placed This Encounter   Procedures    CBC     Standing Status:   Future     Standing Expiration Date:   7/8/2023    Basic Metabolic Panel     Standing Status:   Future     Standing Expiration Date:   7/8/2023    Lipid Panel     Standing Status:   Future     Standing Expiration Date:   7/8/2023     Order Specific Question:   Is Patient Fasting?/# of Hours     Answer:   12 hours    Hepatic Function Panel     Standing Status:   Future     Standing Expiration Date:   7/8/2023    11205 - [place] ELECTROCARDIOGRAM, COMPLETE       Return in about 6 months (around 1/8/2023) for as.      Samantha Birmingham MD Private car

## 2022-08-02 ENCOUNTER — OFFICE VISIT (OUTPATIENT)
Dept: FAMILY MEDICINE CLINIC | Age: 87
End: 2022-08-02

## 2022-08-02 VITALS
SYSTOLIC BLOOD PRESSURE: 138 MMHG | RESPIRATION RATE: 14 BRPM | DIASTOLIC BLOOD PRESSURE: 82 MMHG | WEIGHT: 180.25 LBS | HEIGHT: 70 IN | HEART RATE: 80 BPM | BODY MASS INDEX: 25.8 KG/M2

## 2022-08-02 DIAGNOSIS — I35.0 SEVERE AORTIC STENOSIS: ICD-10-CM

## 2022-08-02 DIAGNOSIS — I50.42 CHRONIC COMBINED SYSTOLIC AND DIASTOLIC CHF (CONGESTIVE HEART FAILURE) (HCC): ICD-10-CM

## 2022-08-02 DIAGNOSIS — H90.3 SENSORINEURAL HEARING LOSS (SNHL) OF BOTH EARS: ICD-10-CM

## 2022-08-02 DIAGNOSIS — E78.00 HYPERCHOLESTEROLEMIA: ICD-10-CM

## 2022-08-02 DIAGNOSIS — I48.0 PAF (PAROXYSMAL ATRIAL FIBRILLATION) (HCC): Primary | ICD-10-CM

## 2022-08-02 DIAGNOSIS — I25.10 CORONARY ARTERY DISEASE INVOLVING NATIVE CORONARY ARTERY OF NATIVE HEART WITHOUT ANGINA PECTORIS: ICD-10-CM

## 2022-08-02 PROBLEM — I50.9 ACUTE CONGESTIVE HEART FAILURE (HCC): Status: RESOLVED | Noted: 2022-06-20 | Resolved: 2022-08-02

## 2022-08-02 PROCEDURE — 1123F ACP DISCUSS/DSCN MKR DOCD: CPT | Performed by: FAMILY MEDICINE

## 2022-08-02 PROCEDURE — 99203 OFFICE O/P NEW LOW 30 MIN: CPT | Performed by: FAMILY MEDICINE

## 2022-08-02 PROCEDURE — 1036F TOBACCO NON-USER: CPT | Performed by: FAMILY MEDICINE

## 2022-08-02 PROCEDURE — G8427 DOCREV CUR MEDS BY ELIG CLIN: HCPCS | Performed by: FAMILY MEDICINE

## 2022-08-02 PROCEDURE — G8417 CALC BMI ABV UP PARAM F/U: HCPCS | Performed by: FAMILY MEDICINE

## 2022-08-02 SDOH — ECONOMIC STABILITY: FOOD INSECURITY: WITHIN THE PAST 12 MONTHS, THE FOOD YOU BOUGHT JUST DIDN'T LAST AND YOU DIDN'T HAVE MONEY TO GET MORE.: NEVER TRUE

## 2022-08-02 SDOH — ECONOMIC STABILITY: FOOD INSECURITY: WITHIN THE PAST 12 MONTHS, YOU WORRIED THAT YOUR FOOD WOULD RUN OUT BEFORE YOU GOT MONEY TO BUY MORE.: NEVER TRUE

## 2022-08-02 ASSESSMENT — PATIENT HEALTH QUESTIONNAIRE - PHQ9
2. FEELING DOWN, DEPRESSED OR HOPELESS: 0
1. LITTLE INTEREST OR PLEASURE IN DOING THINGS: 0
SUM OF ALL RESPONSES TO PHQ QUESTIONS 1-9: 0
SUM OF ALL RESPONSES TO PHQ9 QUESTIONS 1 & 2: 0
SUM OF ALL RESPONSES TO PHQ QUESTIONS 1-9: 0

## 2022-08-02 ASSESSMENT — ENCOUNTER SYMPTOMS
BACK PAIN: 0
NAUSEA: 0
SHORTNESS OF BREATH: 0
ABDOMINAL PAIN: 0
TROUBLE SWALLOWING: 0
CONSTIPATION: 0
COUGH: 0
CHEST TIGHTNESS: 0
SORE THROAT: 0
EYE PAIN: 0
BLOOD IN STOOL: 0

## 2022-08-02 ASSESSMENT — SOCIAL DETERMINANTS OF HEALTH (SDOH): HOW HARD IS IT FOR YOU TO PAY FOR THE VERY BASICS LIKE FOOD, HOUSING, MEDICAL CARE, AND HEATING?: NOT HARD AT ALL

## 2022-08-02 NOTE — PROGRESS NOTES
Subjective:      Patient ID: Erika Moody is a 80 y.o. male. Wife  passed    female  organs and  now   living  with  daughter       Atrial  fib  noted        Hard  of  hearing  noted   and ? Confusion  noted       Aortic  stenosis    severe  noted        Par  atrial  fib  and  noted   on  asa  as  on  elquis        Ashd    noted and  stabl e   hx  of  stents        Bph  stable  up  once  or  twice  at  night       Hearing  loss  noted and no  dementia  of  concern  as  problem  with hearing  but  no  formal  testing     Hypertension  This is a chronic problem. The current episode started more than 1 year ago. The problem has been resolved since onset. The problem is controlled. Pertinent negatives include no chest pain, headaches, palpitations, peripheral edema or shortness of breath. The current treatment provides significant improvement. There are no compliance problems. Past Medical History:   Diagnosis Date    Arthritis     CAD (coronary artery disease)     Dementia ([facility]     Hypercholesterolemia      No past surgical history on file. Social History     Socioeconomic History    Marital status:      Spouse name: Not on file    Number of children: Not on file    Years of education: Not on file    Highest education level: Not on file   Occupational History    Not on file   Tobacco Use    Smoking status: Never    Smokeless tobacco: Never   Substance and Sexual Activity    Alcohol use:  Yes     Alcohol/week: 14.0 standard drinks     Types: 14 Cans of beer per week    Drug use: Never    Sexual activity: Not on file   Other Topics Concern    Not on file   Social History Narrative    Not on file     Social Determinants of Health     Financial Resource Strain: Low Risk     Difficulty of Paying Living Expenses: Not hard at all   Food Insecurity: No Food Insecurity    Worried About [address] in the Last Year: Never true    [address] in the Last Year: Never true   Transportation Needs: normal.      Breath sounds: Normal breath sounds. No wheezing or rales. Abdominal:      General: Bowel sounds are normal.      Palpations: Abdomen is soft. There is no mass. Tenderness: There is no abdominal tenderness. Musculoskeletal:         General: Normal range of motion. Cervical back: Normal range of motion and neck supple. Lymphadenopathy:      Cervical: No cervical adenopathy. Skin:     General: Skin is warm and dry. Findings: No rash. Neurological:      Mental Status: He is alert and oriented to person, place, and time. Cranial Nerves: No cranial nerve deficit. Sensory: No sensory deficit. Motor: No weakness. Gait: Gait normal.      Deep Tendon Reflexes: Reflexes are normal and symmetric. Comments:   Noted   knew  month  year and  unsure  of  date       And  correct  president        Assessment:        ICD-10-CM    1. PAF (paroxysmal atrial fibrillation) ([facility])  I48.0       2. Chronic combined systolic and diastolic CHF (congestive heart failure) ([facility])  I50.42       3. Severe aortic stenosis  I35.0       4. Coronary artery disease involving native coronary artery of native heart without angina pectoris  I25.10       5. Hypercholesterolemia  E78.00       6.  Sensorineural hearing loss (SNHL) of both ears  H90.3              Plan:      Current Outpatient Medications   Medication Sig Dispense Refill    Cain Berry 550 MG CAPS Take 425 mg by mouth      tamsulosin (FLOMAX) 0.4 MG capsule TAKE 1 CAPSULE BY MOUTH EVERY DAY      metoprolol tartrate (LOPRESSOR) 50 MG tablet Take 0.5 tablets by mouth daily 60 tablet 3    GARLIC PO Take 4,273 mg by mouth daily       magnesium oxide (MAG-OX) 400 (240 Mg) MG tablet Take 400 mg by mouth daily 2 tabs daily      Coenzyme Q10 (CO Q10) 100 MG CAPS Take 100 mg by mouth daily       MILK THISTLE EXTRACT PO Take 250 mg by mouth daily 2 capsules daily      aspirin 325 MG EC tablet Take by mouth daily       hydrocortisone 2.5 % cream        No current facility-administered medications for this visit. No orders of the defined types were placed in this encounter. See in  3  mths        ?   To  psychology   for  memory  if  issues   Nato Cruz MD

## 2022-08-09 ENCOUNTER — TELEPHONE (OUTPATIENT)
Dept: FAMILY MEDICINE CLINIC | Age: 87
End: 2022-08-09

## 2022-08-09 DIAGNOSIS — R41.82 MENTAL STATUS, DECREASED: Primary | ICD-10-CM

## 2022-08-09 NOTE — TELEPHONE ENCOUNTER
Lisha pt's daughter called is requesting a referral to [facility] \"first available provider\" for \"an evaluation of mental status\". Fax to [phone]    Debra Andre can be reached at [phone].

## 2022-08-18 ENCOUNTER — TELEPHONE (OUTPATIENT)
Dept: FAMILY MEDICINE CLINIC | Age: 87
End: 2022-08-18

## 2022-08-18 DIAGNOSIS — R41.82 MENTAL STATUS, DECREASED: Primary | ICD-10-CM

## 2022-08-18 NOTE — TELEPHONE ENCOUNTER
General 08/17/2022  1:34  [place], [place]  -   Note    Per Dr. Salome Devlin after review of referral: Please refer out given that this is more of a neurology issue than a psychiatric issue. *Referral denied; patient would need to seek neuropsych testing instead of psychiatric issues.

## 2022-08-18 NOTE — TELEPHONE ENCOUNTER
Internal Referral complete, they will contact the patient with an appt day and time. (Dr Iker Richard)    They will call Domenic Hamilton to schedule the appt.

## 2022-08-18 NOTE — TELEPHONE ENCOUNTER
General 08/18/2022 11:46 AM Gadiel Carr MA  -   Note    Jossie(patient's daughter informed). Jossie states [facility]) was supposed to speak with Dr. Benson Knott regarding testing. Advised that Dr. Benson Knott feels this more of a Neurology issue. Landy Nilo does disagree as she worked as a nurse previously on the psych floor. She mentions Neurology was never involved with diagnosing dementia. Referral has been declined for scheduling at this time. Advised to speak with PCP regarding potential referral to Neurology.

## 2022-09-19 NOTE — TELEPHONE ENCOUNTER
Date of last visit:  8/2/2022  Date of next visit:  11/3/2022    Requested Prescriptions     Pending Prescriptions Disp Refills    tamsulosin (FLOMAX) 0.4 MG capsule 90 capsule      Sig: TAKE 1 CAPSULE BY MOUTH EVERY DAY

## 2022-09-20 RX ORDER — TAMSULOSIN HYDROCHLORIDE 0.4 MG/1
CAPSULE ORAL
Qty: 90 CAPSULE | Refills: 1 | Status: SHIPPED | OUTPATIENT
Start: 2022-09-20

## 2022-11-03 ENCOUNTER — OFFICE VISIT (OUTPATIENT)
Dept: FAMILY MEDICINE CLINIC | Age: 87
End: 2022-11-03

## 2022-11-03 VITALS
RESPIRATION RATE: 16 BRPM | WEIGHT: 186 LBS | HEART RATE: 76 BPM | BODY MASS INDEX: 26.63 KG/M2 | HEIGHT: 70 IN | DIASTOLIC BLOOD PRESSURE: 68 MMHG | SYSTOLIC BLOOD PRESSURE: 116 MMHG

## 2022-11-03 DIAGNOSIS — I50.42 CHRONIC COMBINED SYSTOLIC AND DIASTOLIC CHF (CONGESTIVE HEART FAILURE) (HCC): Primary | ICD-10-CM

## 2022-11-03 DIAGNOSIS — I48.0 PAF (PAROXYSMAL ATRIAL FIBRILLATION) (HCC): ICD-10-CM

## 2022-11-03 DIAGNOSIS — I35.0 SEVERE AORTIC STENOSIS: ICD-10-CM

## 2022-11-03 DIAGNOSIS — E78.00 HYPERCHOLESTEROLEMIA: ICD-10-CM

## 2022-11-03 DIAGNOSIS — I25.10 CORONARY ARTERY DISEASE INVOLVING NATIVE CORONARY ARTERY OF NATIVE HEART WITHOUT ANGINA PECTORIS: ICD-10-CM

## 2022-11-03 DIAGNOSIS — R41.89 COGNITIVE CHANGES: ICD-10-CM

## 2022-11-03 DIAGNOSIS — R53.1 GENERALIZED WEAKNESS: ICD-10-CM

## 2022-11-03 PROCEDURE — 1036F TOBACCO NON-USER: CPT | Performed by: FAMILY MEDICINE

## 2022-11-03 PROCEDURE — 99213 OFFICE O/P EST LOW 20 MIN: CPT | Performed by: FAMILY MEDICINE

## 2022-11-03 PROCEDURE — G8427 DOCREV CUR MEDS BY ELIG CLIN: HCPCS | Performed by: FAMILY MEDICINE

## 2022-11-03 PROCEDURE — G8417 CALC BMI ABV UP PARAM F/U: HCPCS | Performed by: FAMILY MEDICINE

## 2022-11-03 PROCEDURE — 1123F ACP DISCUSS/DSCN MKR DOCD: CPT | Performed by: FAMILY MEDICINE

## 2022-11-03 ASSESSMENT — ENCOUNTER SYMPTOMS
CONSTIPATION: 0
CHEST TIGHTNESS: 0
ABDOMINAL PAIN: 0
BLOOD IN STOOL: 0
BACK PAIN: 0
COUGH: 0
SORE THROAT: 0
NAUSEA: 0
EYE PAIN: 0
SHORTNESS OF BREATH: 0
TROUBLE SWALLOWING: 0

## 2022-11-03 NOTE — PROGRESS NOTES
Subjective:      Patient ID: Ivory Del Castillo is a 80 y.o. male. Ashd  stabl e     Aortic  stenosis  noted           Chf  stable  combined  noted      Par  atrial  fib  stable         No  memory     Hypertension  This is a chronic problem. The current episode started more than 1 year ago. Pertinent negatives include no chest pain, headaches, palpitations or shortness of breath. The current treatment provides significant improvement. There are no compliance problems. Fall  The accident occurred 3 to 5 days ago (on  10-31-22). Fall occurred: outside. He fell from a height of 3 to 5 ft. He landed on [place]. The point of impact was the left elbow. The pain is mild. Pertinent negatives include no abdominal pain, fever, headaches or nausea. He has tried ice for the symptoms. The treatment provided mild relief. Past Medical History:   Diagnosis Date    Acute congestive heart failure ([facility] [address] 6/20/2022    Arthritis     CAD (coronary artery disease)     Dementia (HCC)     Hypercholesterolemia       Review of Systems   Constitutional:  Negative for fatigue and fever. HENT:  Negative for congestion, ear pain, postnasal drip, sore throat and trouble swallowing. Eyes:  Negative for pain. Respiratory:  Negative for cough, chest tightness and shortness of breath. Cardiovascular:  Negative for chest pain, palpitations and leg swelling. Gastrointestinal:  Negative for abdominal pain, blood in stool, constipation and nausea. Genitourinary:  Negative for difficulty urinating, frequency and urgency. Musculoskeletal:  Negative for arthralgias, back pain, joint swelling and neck stiffness. Skin:  Negative for rash. Neurological:  Negative for dizziness, weakness and headaches. Hematological:  Negative for adenopathy. Does not bruise/bleed easily. Psychiatric/Behavioral:  Negative for behavioral problems, dysphoric mood and sleep disturbance.       /68 (Site: Right Upper Arm, Position: Sitting, Cuff Size: Medium Adult)   Pulse 76   Resp 16   Ht 5' 10\" (1.778 m)   Wt 186 lb (84.4 kg)   BMI 26.69 kg/m²    Physical Exam  Vitals and nursing note reviewed. Constitutional:       Appearance: He is well-developed. HENT:      Head: Normocephalic and atraumatic. Right Ear: External ear normal.      Left Ear: External ear normal.      Nose: Nose normal.   Eyes:      Conjunctiva/sclera: Conjunctivae normal.      Pupils: Pupils are equal, round, and reactive to light. Comments: Fundi nl   Neck:      Thyroid: No thyromegaly. Cardiovascular:      Rate and Rhythm: Normal rate. Rhythm irregular. Heart sounds: Normal heart sounds. Pulmonary:      Effort: Pulmonary effort is normal.      Breath sounds: Normal breath sounds. No wheezing or rales. Abdominal:      General: Bowel sounds are normal.      Palpations: Abdomen is soft. There is no mass. Tenderness: There is no abdominal tenderness. Musculoskeletal:         General: Normal range of motion. Cervical back: Normal range of motion and neck supple. Lymphadenopathy:      Cervical: No cervical adenopathy. Skin:     General: Skin is warm and dry. Findings: No rash. Neurological:      Mental Status: He is alert and oriented to person, place, and time. Cranial Nerves: No cranial nerve deficit. Deep Tendon Reflexes: Reflexes are normal and symmetric. Assessment:           ICD-10-CM    1. Chronic combined systolic and diastolic CHF (congestive heart failure) ([facility])  I50.42       2. Hypercholesterolemia  E78.00       3. Coronary artery disease involving native coronary artery of native heart without angina pectoris  I25.10       4. Generalized weakness  R53.1       5. Severe aortic stenosis  I35.0       6. PAF (paroxysmal atrial fibrillation) ([facility])  I48.0       7.  Cognitive changes  R41.89              Plan:      Outpatient Encounter Medications as of 11/3/2022   Medication Sig Dispense Refill    Homeopathic Products [facility] COLDCARE PO) Take by mouth 2-3x per day      tamsulosin (FLOMAX) 0.4 MG capsule TAKE 1 CAPSULE BY MOUTH EVERY DAY 90 capsule 1    hydrocortisone 2.5 % cream       Cain Berry 550 MG CAPS Take 425 mg by mouth      metoprolol tartrate (LOPRESSOR) 50 MG tablet Take 0.5 tablets by mouth daily 60 tablet 3    GARLIC PO Take 3,480 mg by mouth daily       magnesium oxide (MAG-OX) 400 (240 Mg) MG tablet Take 400 mg by mouth daily      Coenzyme Q10 (CO Q10) 100 MG CAPS Take 100 mg by mouth daily       MILK THISTLE EXTRACT PO Take 250 mg by mouth daily 2 capsules daily      aspirin 325 MG EC tablet Take by mouth daily        No facility-administered encounter medications on file as of 11/3/2022. Jamaica Aguirre No results found for this visit on 11/03/22.          Minni mental   at  23/ 30 and  mild  noted  and   better  with  hearing and  may improve and  better  over  time     mild  cognitive    change as  most  poor  hearing and  better  with  hearing  aides   Heather Garcia MD

## 2023-01-16 ENCOUNTER — NURSE ONLY (OUTPATIENT)
Dept: LAB | Age: 88
End: 2023-01-16

## 2023-01-16 DIAGNOSIS — E78.5 DYSLIPIDEMIA (HIGH LDL; LOW HDL): ICD-10-CM

## 2023-01-16 DIAGNOSIS — E78.00 HYPERCHOLESTEROLEMIA: ICD-10-CM

## 2023-01-16 LAB
ALBUMIN SERPL-MCNC: 4 G/DL (ref 3.5–5.1)
ALP BLD-CCNC: 54 U/L (ref 38–126)
ALT SERPL-CCNC: 11 U/L (ref 11–66)
ANION GAP SERPL CALCULATED.3IONS-SCNC: 12 MEQ/L (ref 8–16)
AST SERPL-CCNC: 20 U/L (ref 5–40)
BILIRUB SERPL-MCNC: 0.6 MG/DL (ref 0.3–1.2)
BILIRUBIN DIRECT: < 0.2 MG/DL (ref 0–0.3)
BUN BLDV-MCNC: 11 MG/DL (ref 7–22)
CALCIUM SERPL-MCNC: 9.1 MG/DL (ref 8.5–10.5)
CHLORIDE BLD-SCNC: 102 MEQ/L (ref 98–111)
CHOLESTEROL, TOTAL: 239 MG/DL (ref 100–199)
CO2: 26 MEQ/L (ref 23–33)
CREAT SERPL-MCNC: 0.9 MG/DL (ref 0.4–1.2)
ERYTHROCYTE [DISTWIDTH] IN BLOOD BY AUTOMATED COUNT: 12.6 % (ref 11.5–14.5)
ERYTHROCYTE [DISTWIDTH] IN BLOOD BY AUTOMATED COUNT: 43 FL (ref 35–45)
GFR SERPL CREATININE-BSD FRML MDRD: > 60 ML/MIN/1.73M2
GLUCOSE BLD-MCNC: 98 MG/DL (ref 70–108)
HCT VFR BLD CALC: 51.4 % (ref 42–52)
HDLC SERPL-MCNC: 48 MG/DL
HEMOGLOBIN: 16.6 GM/DL (ref 14–18)
LDL CHOLESTEROL CALCULATED: 174 MG/DL
MCH RBC QN AUTO: 30.1 PG (ref 26–33)
MCHC RBC AUTO-ENTMCNC: 32.3 GM/DL (ref 32.2–35.5)
MCV RBC AUTO: 93.1 FL (ref 80–94)
PLATELET # BLD: 199 THOU/MM3 (ref 130–400)
PMV BLD AUTO: 10.1 FL (ref 9.4–12.4)
POTASSIUM SERPL-SCNC: 4.4 MEQ/L (ref 3.5–5.2)
RBC # BLD: 5.52 MILL/MM3 (ref 4.7–6.1)
SODIUM BLD-SCNC: 140 MEQ/L (ref 135–145)
TOTAL PROTEIN: 6.3 G/DL (ref 6.1–8)
TRIGL SERPL-MCNC: 83 MG/DL (ref 0–199)
WBC # BLD: 4.9 THOU/MM3 (ref 4.8–10.8)

## 2023-01-19 ENCOUNTER — OFFICE VISIT (OUTPATIENT)
Dept: CARDIOLOGY CLINIC | Age: 88
End: 2023-01-19

## 2023-01-19 VITALS
SYSTOLIC BLOOD PRESSURE: 152 MMHG | WEIGHT: 174 LBS | HEART RATE: 92 BPM | HEIGHT: 70 IN | BODY MASS INDEX: 24.91 KG/M2 | DIASTOLIC BLOOD PRESSURE: 87 MMHG | RESPIRATION RATE: 18 BRPM

## 2023-01-19 DIAGNOSIS — I25.10 CORONARY ARTERY DISEASE INVOLVING NATIVE CORONARY ARTERY OF NATIVE HEART WITHOUT ANGINA PECTORIS: Primary | ICD-10-CM

## 2023-01-19 ASSESSMENT — ENCOUNTER SYMPTOMS
TROUBLE SWALLOWING: 0
WHEEZING: 0
SHORTNESS OF BREATH: 0
STRIDOR: 0
APNEA: 0
CHOKING: 0
COLOR CHANGE: 0
COUGH: 0
ANAL BLEEDING: 0
VOMITING: 0
ABDOMINAL DISTENTION: 0
VOICE CHANGE: 0
CHEST TIGHTNESS: 0
BLOOD IN STOOL: 0
ABDOMINAL PAIN: 0
NAUSEA: 0

## 2023-01-19 NOTE — PROGRESS NOTES
[facility] [place] [facility]  [facility]   [address]  [facility] [address]  Dept: [phone]  Loc: [phone]     1/19/2023       Logan Villeda is here today for   Chief Complaint   Patient presents with    Follow-up           Referring Physician:  No ref. provider found     Patient Active Problem List   Diagnosis    Coronary artery disease involving native coronary artery of native heart without angina pectoris    Arthritis    Hypercholesterolemia    COVID-19    Generalized weakness    Asymptomatic cholelithiasis    Non-intractable vomiting with nausea    Moderate malnutrition (HCC)    PAF (paroxysmal atrial fibrillation) (HCC)    RBBB    Enlarged prostate    Hiatal hernia    Severe aortic stenosis    Chronic combined systolic and diastolic CHF (congestive heart failure) (HCC)    Cognitive changes       Review of Systems   Constitutional:  Negative for activity change, appetite change, fatigue, fever and unexpected weight change.   HENT:  Negative for congestion, trouble swallowing and voice change.    Eyes:  Negative for visual disturbance.   Respiratory:  Negative for apnea, cough, choking, chest tightness, shortness of breath, wheezing and stridor.    Cardiovascular:  Negative for chest pain, palpitations and leg swelling.   Gastrointestinal:  Negative for abdominal distention, abdominal pain, anal bleeding, blood in stool, nausea and vomiting.   Endocrine: Negative for cold intolerance and heat intolerance.   Genitourinary:  Negative for hematuria.   Musculoskeletal:  Positive for arthralgias. Negative for gait problem, joint swelling and myalgias.   Skin:  Negative for color change and rash.   Allergic/Immunologic: Negative for environmental allergies and food allergies.   Neurological:  Negative for dizziness, tremors, syncope, facial asymmetry, weakness, light-headedness, numbness and headaches.   Hematological:  Does not bruise/bleed easily.  Psychiatric/Behavioral:  Negative for agitation, behavioral problems and sleep disturbance. Past Medical History:   Diagnosis Date    Acute congestive heart failure ([facility] [address] 06/20/2022    Arthritis     CAD (coronary artery disease)     Cognitive changes 11/2022    Hypercholesterolemia        Allergies   Allergen Reactions    Pcn [Penicillins]        Current Outpatient Medications   Medication Sig Dispense Refill    metoprolol tartrate (LOPRESSOR) 25 MG tablet Take 1 tablet by mouth daily 90 tablet 3    Homeopathic Products ([facility] COLDCARE PO) Take by mouth 2-3x per day      tamsulosin (FLOMAX) 0.4 MG capsule TAKE 1 CAPSULE BY MOUTH EVERY DAY 90 capsule 1    [place] Berry 550 MG CAPS Take 425 mg by mouth      GARLIC PO Take 5,919 mg by mouth daily       magnesium oxide (MAG-OX) 400 (240 Mg) MG tablet Take 400 mg by mouth daily      Coenzyme Q10 (CO Q10) 100 MG CAPS Take 100 mg by mouth daily       MILK THISTLE EXTRACT PO Take 250 mg by mouth daily 2 capsules daily      aspirin 325 MG EC tablet Take by mouth daily       hydrocortisone 2.5 % cream  (Patient not taking: Reported on 1/19/2023)       No current facility-administered medications for this visit. Social History     Socioeconomic History    Marital status:      Spouse name: None    Number of children: None    Years of education: None    Highest education level: None   Tobacco Use    Smoking status: Never    Smokeless tobacco: Never   Vaping Use    Vaping Use: Never used   Substance and Sexual Activity    Alcohol use:  Yes     Alcohol/week: 14.0 standard drinks     Types: 14 Cans of beer per week    Drug use: Never    Sexual activity: Not Currently     Social Determinants of Health     Financial Resource Strain: Low Risk     Difficulty of Paying Living Expenses: Not hard at all   Food Insecurity: No Food Insecurity    Worried About Running Out of Food in the Last Year: Never true    Ran Out of Food in the Last Year: Never true       Family History   Problem Relation Age of Onset    Diabetes Mother     Dementia Mother     Heart Attack Father        Blood pressure (!) 152/87, pulse 92, resp. rate 18, height 5' 10\" (1.778 m), weight 174 lb (78.9 kg). Physical Exam:    General Appearance: alert and oriented to person, place and time, in no acute distress  Cardiovascular: normal rate, regular rhythm, normal S1 and S2, no murmurs, rubs, clicks, or gallops, distal pulses intact, no carotid bruits, no JVD  Pulmonary/Chest: clear to auscultation bilaterally- no wheezes, rales or rhonchi, normal air movement, no respiratory distress  Abdomen: soft, non-tender, non-distended, normal bowel sounds, no masses   Extremities: no cyanosis, clubbing or edema, pulse   Skin: warm and dry, no rash or erythema  Head: normocephalic and atraumatic  Eyes: pupils equal, round, and reactive to light  Neck: supple and non-tender without mass, no thyromegaly   Musculoskeletal: normal range of motion, no joint swelling, deformity or tenderness  Neurological: alert, oriented, normal speech, no focal findings or movement disorder noted    Lab Data:    Cardiac Enzymes:  No results for input(s): CKTOTAL, CKMB, CKMBINDEX, TROPONINI in the last 72 hours.     CBC:   Lab Results   Component Value Date/Time    WBC 4.9 01/16/2023 01:45 PM    RBC 5.52 01/16/2023 01:45 PM    HGB 16.6 01/16/2023 01:45 PM    HCT 51.4 01/16/2023 01:45 PM     01/16/2023 01:45 PM       CMP:    Lab Results   Component Value Date/Time     01/16/2023 01:45 PM    K 4.4 01/16/2023 01:45 PM    K 3.8 06/19/2022 07:00 PM     01/16/2023 01:45 PM    CO2 26 01/16/2023 01:45 PM    BUN 11 01/16/2023 01:45 PM    CREATININE 0.9 01/16/2023 01:45 PM    LABGLOM >60 01/16/2023 01:45 PM    GLUCOSE 98 01/16/2023 01:45 PM    CALCIUM 9.1 01/16/2023 01:45 PM       Hepatic Function Panel:    Lab Results   Component Value Date/Time    ALKPHOS 54 01/16/2023 01:45 PM    ALT 11 01/16/2023 01:45 PM    AST 20 01/16/2023 01:45 PM    PROT 6.3 01/16/2023 01:45 PM    BILITOT 0.6 01/16/2023 01:45 PM    BILIDIR <0.2 01/16/2023 01:45 PM    LABALBU 4.0 01/16/2023 01:45 PM       Magnesium:    Lab Results   Component Value Date/Time    MG 2.1 06/19/2022 11:12 PM       PT/INR:  No results found for: PROTIME, INR    HgBA1c:    Lab Results   Component Value Date/Time    LABA1C 5.7 06/19/2022 11:12 PM       FLP:    Lab Results   Component Value Date/Time    TRIG 83 01/16/2023 01:45 PM    HDL 48 01/16/2023 01:45 PM    LDLCALC 174 01/16/2023 01:45 PM       TSH:    Lab Results   Component Value Date/Time    TSH 2.740 06/19/2022 07:00 PM        Diagnosis Orders   1. Coronary artery disease involving native coronary artery of native heart without angina pectoris  68446 - [place] ELECTROCARDIOGRAM, COMPLETE           Assessment/Plan    Trupti West is 80years old gentleman history of coronary artery disease he has history of prior intervention the patient is here for a follow-up. He indicates that he is feeling well he denies chest pain in the patient had history of hypercholesterolemia his cholesterol is elevated he is not taking any statin at this point the patient advised about diet patient has a new hearing aids the patient has arthritis and he was encouraged about increase activity his EKG showed sinus rhythm bundle branch block PVCs overall patient decayed he is feeling well he wants to continue medical treatment he will be seen annually seek medical attention with any change in clinical condition thank you finding the EKG finding the plan of treatment were discussed with him and his family in great detail thank    Orders Placed This Encounter   Procedures    16314 - [place] ELECTROCARDIOGRAM, COMPLETE       Return in about 1 year (around 1/19/2024) for cad.      Kenna Villagran MD

## 2023-01-30 ENCOUNTER — CLINICAL DOCUMENTATION (OUTPATIENT)
Dept: SPIRITUAL SERVICES | Facility: CLINIC | Age: 88
End: 2023-01-30

## 2023-01-30 NOTE — ACP (ADVANCE CARE PLANNING)
Advance Care Planning   Advance Care Planning Note  Ambulatory Spiritual Care Services    Date:  1/30/2023    Received request from family. Consultation conversation participants:   Patient's child(sylvester)     Goals of ACP Conversation:  Discuss advance care planning documents    Health Care Decision Makers:      Primary Decision Maker: Yomi Munoz Child - [phone]    Secondary Decision Maker: Nicholas Louis Child - [phone]   Summary:  Verified Documents  Verified Healthcare Decision Maker    Advance Care Planning Documents (Patient Wishes)  Currently on file:   Healthcare Power of /Advance Directive Appointment of Health Care Agent    Interventions:   met Logan's daughter to received completed documents to be faxed to Medical Records and scanned into patient's file reflecting his current wishes.  verified that documents had already been scanned into patient's file.  updated Lorna [address], per documents. Outcomes:  ACP Discussion: Completed  Copy of advance directive given to staff to scan into medical record.     Electronically signed by Jaime Jauregui on 1/30/2023 at 2:59 PM.

## 2023-02-10 ENCOUNTER — TELEPHONE (OUTPATIENT)
Dept: FAMILY MEDICINE CLINIC | Age: 88
End: 2023-02-10

## 2023-02-10 NOTE — TELEPHONE ENCOUNTER
Deborah Baker called stating that [employer] ender passed away. He's had her over 30 years and taking care of her was keeping him busy. He is having some bilateral knee pain and wondering if they can use Salon Pas. He does take Ecotrin daily. Please call Jossie. Verbal order Dr Garett Cr, [place].

## 2023-04-03 ENCOUNTER — OFFICE VISIT (OUTPATIENT)
Dept: FAMILY MEDICINE CLINIC | Age: 88
End: 2023-04-03

## 2023-04-03 VITALS — SYSTOLIC BLOOD PRESSURE: 128 MMHG | DIASTOLIC BLOOD PRESSURE: 68 MMHG | RESPIRATION RATE: 16 BRPM | HEART RATE: 96 BPM

## 2023-04-03 DIAGNOSIS — I25.10 CORONARY ARTERY DISEASE INVOLVING NATIVE CORONARY ARTERY OF NATIVE HEART WITHOUT ANGINA PECTORIS: ICD-10-CM

## 2023-04-03 DIAGNOSIS — R41.89 COGNITIVE CHANGES: ICD-10-CM

## 2023-04-03 DIAGNOSIS — I45.10 RBBB: ICD-10-CM

## 2023-04-03 DIAGNOSIS — I48.0 PAF (PAROXYSMAL ATRIAL FIBRILLATION) (HCC): ICD-10-CM

## 2023-04-03 DIAGNOSIS — H60.11 CELLULITIS OF TRAGUS OF RIGHT EAR: ICD-10-CM

## 2023-04-03 DIAGNOSIS — I35.0 SEVERE AORTIC STENOSIS: Primary | ICD-10-CM

## 2023-04-03 DIAGNOSIS — M17.0 BILATERAL PRIMARY OSTEOARTHRITIS OF KNEE: ICD-10-CM

## 2023-04-03 DIAGNOSIS — I50.42 CHRONIC COMBINED SYSTOLIC AND DIASTOLIC CHF (CONGESTIVE HEART FAILURE) (HCC): ICD-10-CM

## 2023-04-03 DIAGNOSIS — N40.0 BENIGN PROSTATIC HYPERPLASIA WITHOUT LOWER URINARY TRACT SYMPTOMS: ICD-10-CM

## 2023-04-03 DIAGNOSIS — E78.00 HYPERCHOLESTEROLEMIA: ICD-10-CM

## 2023-04-03 DIAGNOSIS — H90.3 SENSORINEURAL HEARING LOSS (SNHL) OF BOTH EARS: ICD-10-CM

## 2023-04-03 DIAGNOSIS — E44.0 MODERATE PROTEIN-CALORIE MALNUTRITION (HCC): ICD-10-CM

## 2023-04-03 PROBLEM — R11.2 NON-INTRACTABLE VOMITING WITH NAUSEA: Status: RESOLVED | Noted: 2022-06-20 | Resolved: 2023-04-03

## 2023-04-03 PROBLEM — U07.1 COVID-19: Status: RESOLVED | Noted: 2022-06-19 | Resolved: 2023-04-03

## 2023-04-03 RX ORDER — FUROSEMIDE 40 MG/1
40 TABLET ORAL DAILY PRN
Qty: 20 TABLET | Refills: 0 | Status: SHIPPED | OUTPATIENT
Start: 2023-04-03

## 2023-04-03 RX ORDER — TAMSULOSIN HYDROCHLORIDE 0.4 MG/1
CAPSULE ORAL
Qty: 90 CAPSULE | Refills: 1 | Status: SHIPPED | OUTPATIENT
Start: 2023-04-03

## 2023-04-03 RX ORDER — MENTHOL, METHYL SALICYLATE 31.5; 105 MG/1; MG/1
1 PATCH PERCUTANEOUS; TOPICAL; TRANSDERMAL DAILY
COMMUNITY

## 2023-04-03 RX ORDER — CEPHALEXIN 500 MG/1
500 CAPSULE ORAL 3 TIMES DAILY
Qty: 15 CAPSULE | Refills: 0 | Status: SHIPPED | OUTPATIENT
Start: 2023-04-03

## 2023-04-03 SDOH — ECONOMIC STABILITY: INCOME INSECURITY: HOW HARD IS IT FOR YOU TO PAY FOR THE VERY BASICS LIKE FOOD, HOUSING, MEDICAL CARE, AND HEATING?: NOT HARD AT ALL

## 2023-04-03 SDOH — ECONOMIC STABILITY: FOOD INSECURITY: WITHIN THE PAST 12 MONTHS, THE FOOD YOU BOUGHT JUST DIDN'T LAST AND YOU DIDN'T HAVE MONEY TO GET MORE.: NEVER TRUE

## 2023-04-03 SDOH — ECONOMIC STABILITY: FOOD INSECURITY: WITHIN THE PAST 12 MONTHS, YOU WORRIED THAT YOUR FOOD WOULD RUN OUT BEFORE YOU GOT MONEY TO BUY MORE.: NEVER TRUE

## 2023-04-03 SDOH — ECONOMIC STABILITY: HOUSING INSECURITY
IN THE LAST 12 MONTHS, WAS THERE A TIME WHEN YOU DID NOT HAVE A STEADY PLACE TO SLEEP OR SLEPT IN A SHELTER (INCLUDING NOW)?: NO

## 2023-04-03 ASSESSMENT — PATIENT HEALTH QUESTIONNAIRE - PHQ9
SUM OF ALL RESPONSES TO PHQ QUESTIONS 1-9: 0
SUM OF ALL RESPONSES TO PHQ QUESTIONS 1-9: 0
2. FEELING DOWN, DEPRESSED OR HOPELESS: 0
SUM OF ALL RESPONSES TO PHQ QUESTIONS 1-9: 0
1. LITTLE INTEREST OR PLEASURE IN DOING THINGS: 0
SUM OF ALL RESPONSES TO PHQ9 QUESTIONS 1 & 2: 0
SUM OF ALL RESPONSES TO PHQ QUESTIONS 1-9: 0

## 2023-04-03 ASSESSMENT — ENCOUNTER SYMPTOMS
SHORTNESS OF BREATH: 0
BACK PAIN: 0
CONSTIPATION: 0
EYE PAIN: 0
SORE THROAT: 0
BLOOD IN STOOL: 0
TROUBLE SWALLOWING: 0
CHEST TIGHTNESS: 0
COUGH: 0
NAUSEA: 0
ABDOMINAL PAIN: 0

## 2023-04-03 NOTE — PROGRESS NOTES
R41.89       7. Hypercholesterolemia  E78.00       8. Coronary artery disease involving native coronary artery of native heart without angina pectoris  I25.10       9. Bilateral primary osteoarthritis of knee  M17.0 AFL - Marrie Hashimoto, MD, Orthopedic Surgery, [facility]      10. Cellulitis of tragus of right ear  H60.11 cephALEXin (KEFLEX) 500 MG capsule      11. Sensorineural hearing loss (SNHL) of both ears  H90.3       12. Benign prostatic hyperplasia without lower urinary tract symptoms  N40.0 tamsulosin (FLOMAX) 0.4 MG capsule             Plan:      Current Outpatient Medications   Medication Sig Dispense Refill    NONFORMULARY Cataplex-C 2-3 times a day      Menthol-Methyl Salicylate (SALONPAS PAIN RELIEF PATCH) 3-10 % PTCH Apply 1 patch topically daily      NONFORMULARY Kailo Patch daily      tamsulosin (FLOMAX) 0.4 MG capsule TAKE 1 CAPSULE BY MOUTH EVERY DAY 90 capsule 1    cephALEXin (KEFLEX) 500 MG capsule Take 1 capsule by mouth 3 times daily 15 capsule 0    furosemide (LASIX) 40 MG tablet Take 1 tablet by mouth daily as needed (weight  gain) 20 tablet 0    metoprolol tartrate (LOPRESSOR) 25 MG tablet Take 1 tablet by mouth daily 90 tablet 3    Homeopathic Products ([facility] COLDCARE PO) Take by mouth 2-3x per day      Cain Berry 550 MG CAPS Take 425 mg by mouth      GARLIC PO Take 7,396 mg by mouth daily       magnesium oxide (MAG-OX) 400 (240 Mg) MG tablet Take 1 tablet by mouth daily      Coenzyme Q10 (CO Q10) 100 MG CAPS Take 100 mg by mouth daily       MILK THISTLE EXTRACT PO Take 250 mg by mouth daily 2 capsules daily      aspirin 325 MG EC tablet Take by mouth daily        No current facility-administered medications for this visit.       Orders Placed This Encounter   Procedures    AFL - Marrie Hashimoto, MD, Orthopedic Surgery, [facility]     Referral Priority:   Routine     Referral Type:   Eval and Treat     Referral Reason:   Specialty Services Required     Referred to Provider:   Liliana Sifuentes MD

## 2023-04-06 ENCOUNTER — TELEPHONE (OUTPATIENT)
Dept: FAMILY MEDICINE CLINIC | Age: 88
End: 2023-04-06

## 2023-04-06 NOTE — TELEPHONE ENCOUNTER
Jossie called, left message with answering service that she called [facility] and they have not received the referral to Dr Randy Willson. Please call her when done.

## 2023-04-13 ENCOUNTER — TELEPHONE (OUTPATIENT)
Dept: FAMILY MEDICINE CLINIC | Age: 88
End: 2023-04-13

## 2023-04-17 NOTE — TELEPHONE ENCOUNTER
Date of last visit:  4/3/2023  Date of next visit:  8/7/2023    Requested Prescriptions     Signed Prescriptions Disp Refills    neomycin-polymyxin-hydrocortisone (CORTISPORIN) 3.5-58612-6 otic solution 10 mL 0     Sig: Instill 4 gtts into Right Ear 3x a day for 10 days     Authorizing Provider: Luis Oconnor     Ordering User: GATITO MENDOZA    mupirocin (BACTROBAN) 2 % ointment 1 g 0     Sig: Apply topically 3 times daily to Outside of Right Ear     Authorizing Provider: Luis Oconnor     Ordering User: Perla MENDOZA informed, the Bactroban is for the water like blister he gets getting on the Outside of his ear.

## 2023-08-07 ENCOUNTER — OFFICE VISIT (OUTPATIENT)
Dept: FAMILY MEDICINE CLINIC | Age: 88
End: 2023-08-07

## 2023-08-07 VITALS
BODY MASS INDEX: 26.54 KG/M2 | WEIGHT: 185.38 LBS | HEIGHT: 70 IN | DIASTOLIC BLOOD PRESSURE: 78 MMHG | RESPIRATION RATE: 16 BRPM | SYSTOLIC BLOOD PRESSURE: 136 MMHG | HEART RATE: 96 BPM

## 2023-08-07 DIAGNOSIS — I25.10 CORONARY ARTERY DISEASE INVOLVING NATIVE CORONARY ARTERY OF NATIVE HEART WITHOUT ANGINA PECTORIS: ICD-10-CM

## 2023-08-07 DIAGNOSIS — E78.00 HYPERCHOLESTEROLEMIA: ICD-10-CM

## 2023-08-07 DIAGNOSIS — I35.0 SEVERE AORTIC STENOSIS: ICD-10-CM

## 2023-08-07 DIAGNOSIS — K80.20 ASYMPTOMATIC CHOLELITHIASIS: ICD-10-CM

## 2023-08-07 DIAGNOSIS — I45.10 RBBB: ICD-10-CM

## 2023-08-07 DIAGNOSIS — I50.42 CHRONIC COMBINED SYSTOLIC AND DIASTOLIC CHF (CONGESTIVE HEART FAILURE) (HCC): Primary | ICD-10-CM

## 2023-08-07 DIAGNOSIS — T14.8XXA ABRASION: ICD-10-CM

## 2023-08-07 DIAGNOSIS — I48.0 PAF (PAROXYSMAL ATRIAL FIBRILLATION) (HCC): ICD-10-CM

## 2023-08-07 PROCEDURE — G8417 CALC BMI ABV UP PARAM F/U: HCPCS | Performed by: FAMILY MEDICINE

## 2023-08-07 PROCEDURE — 1036F TOBACCO NON-USER: CPT | Performed by: FAMILY MEDICINE

## 2023-08-07 PROCEDURE — 99213 OFFICE O/P EST LOW 20 MIN: CPT | Performed by: FAMILY MEDICINE

## 2023-08-07 PROCEDURE — 1123F ACP DISCUSS/DSCN MKR DOCD: CPT | Performed by: FAMILY MEDICINE

## 2023-08-07 PROCEDURE — G8427 DOCREV CUR MEDS BY ELIG CLIN: HCPCS | Performed by: FAMILY MEDICINE

## 2023-08-07 ASSESSMENT — ENCOUNTER SYMPTOMS
ORTHOPNEA: 0
CHEST TIGHTNESS: 0
BLOOD IN STOOL: 0
TROUBLE SWALLOWING: 0
ABDOMINAL PAIN: 0
CONSTIPATION: 0
COUGH: 1
BACK PAIN: 0
NAUSEA: 0
EYE PAIN: 0
SORE THROAT: 0
SHORTNESS OF BREATH: 1

## 2023-08-07 NOTE — PROGRESS NOTES
Subjective:      Patient ID: Vikki Longoria is a 80 y.o. male. Hx  of  chf  stabl e      Aortic    stenosis     severe  stable       Bph  noted        Knee  injected  with  no  help      Mentation  now  better     Hypertension  This is a chronic problem. The current episode started more than 1 year ago. The problem has been resolved since onset. The problem is controlled. Associated symptoms include shortness of breath. Pertinent negatives include no chest pain, headaches, orthopnea, palpitations or peripheral edema. The current treatment provides significant improvement. There are no compliance problems. Hyperlipidemia  This is a chronic problem. The current episode started more than 1 year ago. The problem is controlled. Associated symptoms include shortness of breath. Pertinent negatives include no chest pain, focal weakness or myalgias. Current antihyperlipidemic treatment includes statins. The current treatment provides significant improvement of lipids. There are no compliance problems. Past Medical History:   Diagnosis Date    Acute congestive heart failure ([address]) 06/20/2022    Arthritis     CAD (coronary artery disease)     Cognitive changes 11/2022    COVID-19 6/19/2022    Hypercholesterolemia     Moderate malnutrition ([address]) 6/22/2022      Review of Systems   Constitutional:  Negative for fatigue and fever. HENT:  Negative for congestion, ear pain, postnasal drip, sore throat and trouble swallowing. Eyes:  Negative for pain. Respiratory:  Positive for cough and shortness of breath. Negative for chest tightness. Cardiovascular:  Negative for chest pain, palpitations, orthopnea and leg swelling. Gastrointestinal:  Negative for abdominal pain, blood in stool, constipation and nausea. Genitourinary:  Negative for difficulty urinating, frequency and urgency. Musculoskeletal:  Negative for arthralgias, back pain, joint swelling, myalgias and neck stiffness. Skin:  Negative for rash.

## 2023-08-08 ENCOUNTER — TELEPHONE (OUTPATIENT)
Dept: FAMILY MEDICINE CLINIC | Age: 88
End: 2023-08-08

## 2023-08-08 RX ORDER — AZITHROMYCIN 250 MG/1
TABLET, FILM COATED ORAL
Qty: 1 PACKET | Refills: 0 | Status: SHIPPED | OUTPATIENT
Start: 2023-08-08

## 2023-08-08 NOTE — TELEPHONE ENCOUNTER
Date of last visit:  8/7/2023  Date of next visit:  12/8/2023    Requested Prescriptions     Signed Prescriptions Disp Refills    azithromycin (ZITHROMAX) 250 MG tablet 1 packet 0     Sig: Take 2 tabs (500 mg) on Day 1, and take 1 tab (250 mg) on days 2 through 5. Authorizing Provider: Wander Muller     Ordering User: GATITO MENDOZA       Called and spoke with [facility]- she stated when he started coughing last night he was gagging and then started Vomiting.  Advised via Voicemail to  Mucinex DM OTC and not Mucinex D.

## 2023-08-08 NOTE — TELEPHONE ENCOUNTER
Pts daughter called in she noticed last night that he was coughing up a lot of phlegm and gurgling in his chest.     Pts daughter would like Dr Nyla Singleton nurse to give her call back when she gets a chance. Please give michael a call back.

## 2023-08-11 ENCOUNTER — TELEPHONE (OUTPATIENT)
Dept: FAMILY MEDICINE CLINIC | Age: 88
End: 2023-08-11

## 2023-08-11 RX ORDER — PREDNISONE 20 MG/1
TABLET ORAL
Qty: 10 TABLET | Refills: 0 | Status: SHIPPED | OUTPATIENT
Start: 2023-08-11

## 2023-08-11 RX ORDER — BENZONATATE 200 MG/1
200 CAPSULE ORAL 3 TIMES DAILY PRN
Qty: 30 CAPSULE | Refills: 0 | Status: SHIPPED | OUTPATIENT
Start: 2023-08-11

## 2023-08-11 NOTE — TELEPHONE ENCOUNTER
Date of last visit:  8/7/2023  Date of next visit:  12/8/2023    Requested Prescriptions     Signed Prescriptions Disp Refills    predniSONE (DELTASONE) 20 MG tablet 10 tablet 0     Sig: Take 1 tablet, PO BID x 5 days     Authorizing Provider: Elida Haines     Ordering User: GATITO MENDOZA    benzonatate (TESSALON) 200 MG capsule 30 capsule 0     Sig: Take 1 capsule by mouth 3 times daily as needed for Cough     Authorizing Provider: Elida Haines     Ordering User: GATITO MENDOZA         Patient is still coughing, Aleida Feliz will start the ATB today and start on PDN.

## 2023-08-13 ENCOUNTER — TELEPHONE (OUTPATIENT)
Dept: FAMILY MEDICINE CLINIC | Age: 88
End: 2023-08-13

## 2023-08-13 DIAGNOSIS — J20.9 ACUTE BRONCHITIS, UNSPECIFIED ORGANISM: Primary | ICD-10-CM

## 2023-08-13 DIAGNOSIS — J21.9 ACUTE BRONCHIOLITIS DUE TO UNSPECIFIED ORGANISM: Primary | ICD-10-CM

## 2023-08-13 RX ORDER — ALBUTEROL SULFATE 90 UG/1
2 AEROSOL, METERED RESPIRATORY (INHALATION) EVERY 4 HOURS PRN
Qty: 18 G | Refills: 0 | Status: SHIPPED | OUTPATIENT
Start: 2023-08-13

## 2023-08-14 ENCOUNTER — TELEMEDICINE (OUTPATIENT)
Dept: FAMILY MEDICINE CLINIC | Age: 88
End: 2023-08-14

## 2023-08-14 DIAGNOSIS — I25.10 CORONARY ARTERY DISEASE INVOLVING NATIVE CORONARY ARTERY OF NATIVE HEART WITHOUT ANGINA PECTORIS: ICD-10-CM

## 2023-08-14 DIAGNOSIS — J20.9 ACUTE BRONCHITIS WITH BRONCHOSPASM: Primary | ICD-10-CM

## 2023-08-14 DIAGNOSIS — I48.0 PAF (PAROXYSMAL ATRIAL FIBRILLATION) (HCC): ICD-10-CM

## 2023-08-14 DIAGNOSIS — I35.0 SEVERE AORTIC STENOSIS: ICD-10-CM

## 2023-08-14 DIAGNOSIS — I50.42 CHRONIC COMBINED SYSTOLIC AND DIASTOLIC CHF (CONGESTIVE HEART FAILURE) (HCC): ICD-10-CM

## 2023-08-14 PROCEDURE — 1123F ACP DISCUSS/DSCN MKR DOCD: CPT | Performed by: FAMILY MEDICINE

## 2023-08-14 PROCEDURE — G8427 DOCREV CUR MEDS BY ELIG CLIN: HCPCS | Performed by: FAMILY MEDICINE

## 2023-08-14 PROCEDURE — G8417 CALC BMI ABV UP PARAM F/U: HCPCS | Performed by: FAMILY MEDICINE

## 2023-08-14 PROCEDURE — 99213 OFFICE O/P EST LOW 20 MIN: CPT | Performed by: FAMILY MEDICINE

## 2023-08-14 PROCEDURE — 1036F TOBACCO NON-USER: CPT | Performed by: FAMILY MEDICINE

## 2023-08-14 RX ORDER — PREDNISONE 20 MG/1
20 TABLET ORAL DAILY
Qty: 7 TABLET | Refills: 0 | Status: SHIPPED | OUTPATIENT
Start: 2023-08-14 | End: 2023-08-21

## 2023-08-14 RX ORDER — LEVOFLOXACIN 500 MG/1
500 TABLET, FILM COATED ORAL DAILY
Qty: 10 TABLET | Refills: 0 | Status: SHIPPED | OUTPATIENT
Start: 2023-08-14 | End: 2023-08-24

## 2023-08-14 RX ORDER — IPRATROPIUM BROMIDE AND ALBUTEROL SULFATE 2.5; .5 MG/3ML; MG/3ML
1 SOLUTION RESPIRATORY (INHALATION) EVERY 4 HOURS PRN
Qty: 360 ML | Refills: 0 | Status: SHIPPED | OUTPATIENT
Start: 2023-08-14

## 2023-08-14 ASSESSMENT — ENCOUNTER SYMPTOMS
CONSTIPATION: 0
EYE PAIN: 0
CHEST TIGHTNESS: 0
BLOOD IN STOOL: 0
NAUSEA: 0
SORE THROAT: 0
SHORTNESS OF BREATH: 0
ABDOMINAL PAIN: 0
BACK PAIN: 0
TROUBLE SWALLOWING: 0
WHEEZING: 1
COUGH: 1

## 2023-08-14 NOTE — PROGRESS NOTES
Logan agreed to Video Chat/Exam in presence of Dr Wayne Emerson and myself. Verified who was present in room with [facility]. Logan informed the e-mail address used to Face Time cannot be used to contact the Provider, if they are any questions or concerns they need to call the office directly. [facility] stated understanding.

## 2023-08-14 NOTE — PROGRESS NOTES
Obie Presley (:  3/22/1932) is a Established patient, presenting virtually for evaluation of the following:    Assessment & Plan       ICD-10-CM    1. Acute bronchitis with bronchospasm  J20.9       2. PAF (paroxysmal atrial fibrillation) ([facility])  I48.0       3. Chronic combined systolic and diastolic CHF (congestive heart failure) ([facility])  I50.42       4. Severe aortic stenosis  I35.0       5.  Coronary artery disease involving native coronary artery of native heart without angina pectoris  I25.10            PLAN  Current Outpatient Medications   Medication Sig Dispense Refill    levoFLOXacin (LEVAQUIN) 500 MG tablet Take 1 tablet by mouth daily for 10 days 10 tablet 0    predniSONE (DELTASONE) 20 MG tablet Take 1 tablet by mouth daily for 7 days 7 tablet 0    ipratropium 0.5 mg-albuterol 2.5 mg (DUONEB) 0.5-2.5 (3) MG/3ML SOLN nebulizer solution Inhale 3 mLs into the lungs every 4 hours as needed for Shortness of Breath 360 mL 0    albuterol sulfate HFA (VENTOLIN HFA) 108 (90 Base) MCG/ACT inhaler Inhale 2 puffs into the lungs every 4 hours as needed for Wheezing or Shortness of Breath 18 g 0    Spacer/Aero-Holding Chambers KATY 1 Device by Does not apply route every 4 hours as needed (sob or congestion) 1 each 2    predniSONE (DELTASONE) 20 MG tablet Take 1 tablet, PO BID x 5 days 10 tablet 0    benzonatate (TESSALON) 200 MG capsule Take 1 capsule by mouth 3 times daily as needed for Cough 30 capsule 0    azithromycin (ZITHROMAX) 250 MG tablet Take 2 tabs (500 mg) on Day 1, and take 1 tab (250 mg) on days 2 through 5. 1 packet 0    mupirocin (BACTROBAN) 2 % ointment Apply topically 3 times daily to Outside of Right Ear 1 g 3    NONFORMULARY Cataplex-C 2-3 times a day      Menthol-Methyl Salicylate (SALONPAS PAIN RELIEF PATCH) 3-10 % PTCH Apply 1 patch topically daily      NONFORMULARY Kailo Patch daily      tamsulosin (FLOMAX) 0.4 MG capsule TAKE 1 CAPSULE BY MOUTH EVERY DAY 90 capsule 1    furosemide (LASIX) 40

## 2023-08-15 ENCOUNTER — NURSE ONLY (OUTPATIENT)
Dept: LAB | Age: 88
End: 2023-08-15

## 2023-08-15 ENCOUNTER — HOSPITAL ENCOUNTER (OUTPATIENT)
Age: 88
Discharge: HOME | End: 2023-08-15
Payer: MEDICARE

## 2023-08-15 ENCOUNTER — HOSPITAL ENCOUNTER (OUTPATIENT)
Dept: GENERAL RADIOLOGY | Age: 88
Discharge: HOME | End: 2023-08-15
Payer: MEDICARE

## 2023-08-15 ENCOUNTER — TELEPHONE (OUTPATIENT)
Dept: FAMILY MEDICINE CLINIC | Age: 88
End: 2023-08-15

## 2023-08-15 DIAGNOSIS — J20.9 ACUTE BRONCHITIS WITH BRONCHOSPASM: ICD-10-CM

## 2023-08-15 DIAGNOSIS — J20.9 ACUTE BRONCHITIS WITH BRONCHOSPASM: Primary | ICD-10-CM

## 2023-08-15 DIAGNOSIS — I50.42 CHRONIC COMBINED SYSTOLIC AND DIASTOLIC CHF (CONGESTIVE HEART FAILURE) (HCC): ICD-10-CM

## 2023-08-15 LAB
ANION GAP SERPL CALC-SCNC: 13 MEQ/L (ref 8–16)
BASOPHILS ABSOLUTE: 0 THOU/MM3 (ref 0–0.1)
BASOPHILS NFR BLD AUTO: 0.2 %
BUN SERPL-MCNC: 18 MG/DL (ref 7–22)
CALCIUM SERPL-MCNC: 9.1 MG/DL (ref 8.5–10.5)
CHLORIDE SERPL-SCNC: 92 MEQ/L (ref 98–111)
CO2 SERPL-SCNC: 26 MEQ/L (ref 23–33)
CREAT SERPL-MCNC: 1 MG/DL (ref 0.4–1.2)
DEPRECATED RDW RBC AUTO: 41.9 FL (ref 35–45)
EOSINOPHIL NFR BLD AUTO: 0.2 %
EOSINOPHILS ABSOLUTE: 0 THOU/MM3 (ref 0–0.4)
ERYTHROCYTE [DISTWIDTH] IN BLOOD BY AUTOMATED COUNT: 12.1 % (ref 11.5–14.5)
GFR SERPL CREATININE-BSD FRML MDRD: > 60 ML/MIN/1.73M2
GLUCOSE SERPL-MCNC: 191 MG/DL (ref 70–108)
HCT VFR BLD AUTO: 50.4 % (ref 42–52)
HGB BLD-MCNC: 16 GM/DL (ref 14–18)
IMM GRANULOCYTES # BLD AUTO: 0.04 THOU/MM3 (ref 0–0.07)
IMM GRANULOCYTES NFR BLD AUTO: 0.5 %
LYMPHOCYTES ABSOLUTE: 0.4 THOU/MM3 (ref 1–4.8)
LYMPHOCYTES NFR BLD AUTO: 4.3 %
MCH RBC QN AUTO: 29.8 PG (ref 26–33)
MCHC RBC AUTO-ENTMCNC: 31.7 GM/DL (ref 32.2–35.5)
MCV RBC AUTO: 93.9 FL (ref 80–94)
MONOCYTES ABSOLUTE: 0.8 THOU/MM3 (ref 0.4–1.3)
MONOCYTES NFR BLD AUTO: 9.8 %
NEUTROPHILS NFR BLD AUTO: 85 %
NRBC BLD AUTO-RTO: 0 /100 WBC
NT-PROBNP SERPL IA-MCNC: 5183 PG/ML (ref 0–449)
PLATELET # BLD AUTO: 218 THOU/MM3 (ref 130–400)
PMV BLD AUTO: 10.3 FL (ref 9.4–12.4)
POTASSIUM SERPL-SCNC: 4.2 MEQ/L (ref 3.5–5.2)
RBC # BLD AUTO: 5.37 MILL/MM3 (ref 4.7–6.1)
SEGMENTED NEUTROPHILS ABSOLUTE COUNT: 7.3 THOU/MM3 (ref 1.8–7.7)
SODIUM SERPL-SCNC: 131 MEQ/L (ref 135–145)
WBC # BLD AUTO: 8.6 THOU/MM3 (ref 4.8–10.8)

## 2023-08-15 PROCEDURE — 71046 X-RAY EXAM CHEST 2 VIEWS: CPT

## 2023-08-15 NOTE — TELEPHONE ENCOUNTER
Nebulizer Order, Tubing Set, Script, and Office Notes faxed to the # provided. Lisha informed via Phone.

## 2023-08-15 NOTE — TELEPHONE ENCOUNTER
Pt's wife called - said that [facility] in [address] [place] [address] [facility] need Rx for the nebulizer, doctor NPI, diagnosis code and chart notes with why nebulizer is needed. She also said that they might as well get the Duoneb there also. She said she would call them back and make sure the insurance covers the Duoneb there.     Fax # [phone]

## 2023-08-16 ENCOUNTER — TELEPHONE (OUTPATIENT)
Dept: FAMILY MEDICINE CLINIC | Age: 88
End: 2023-08-16

## 2023-08-16 NOTE — TELEPHONE ENCOUNTER
Jossie informed and stated that [facility]'s said his insurance would not cover the Nebulizer or the Duoneb. Advised her to contact them and see what we could do to help get it covered. She did go ahead and purchase them.

## 2023-08-16 NOTE — TELEPHONE ENCOUNTER
----- Message from Wood Alcocer MD sent at 8/16/2023  5:36 AM EDT -----  Probable bronchitis with some chf related to illness and sever aortic stenosis     Lasix 40 mg bid for 3 days then 1/2 tab daily for 5 days then stop   Inform how doing 8-18-23     Make sure get 2 doses in on 8-16-23     Stay on levaquin and the aerosols for 5 days at least

## 2023-08-18 NOTE — TELEPHONE ENCOUNTER
Pts daughter called in to let Dr Wayne Emerson know how he's doing and he's somewhat better but is still feeling fatigue. He's not coughing as much anymore. Lung sounds better.     Pulse ox 94-99

## 2023-08-22 ENCOUNTER — TELEPHONE (OUTPATIENT)
Dept: FAMILY MEDICINE CLINIC | Age: 88
End: 2023-08-22

## 2023-08-22 NOTE — TELEPHONE ENCOUNTER
Lisha called stating that patient has been taking his tamsulosin at supper time. He has been going to bed at 10-10:30 and has to get up at 1:30 and again 4:30-5:30 to use restroom. He then sleeps until 2. She is asking if it would be ok to switch patient to taking this medication with breakfast instead? Her other question is if she is able to switch it should she hold it one night and start in the morning then?     Nelson Dias may be reached at [phone]

## 2023-08-23 NOTE — TELEPHONE ENCOUNTER
Can try but takes the tamsulosin or flomax at pm so protate relaxes and empties bladder at night better      At 89  sleeps more      Ok to try  and see if works

## 2023-10-04 DIAGNOSIS — N40.0 BENIGN PROSTATIC HYPERPLASIA WITHOUT LOWER URINARY TRACT SYMPTOMS: ICD-10-CM

## 2023-10-04 RX ORDER — TAMSULOSIN HYDROCHLORIDE 0.4 MG/1
CAPSULE ORAL
Qty: 90 CAPSULE | Refills: 1 | Status: SHIPPED | OUTPATIENT
Start: 2023-10-04

## 2023-10-04 NOTE — TELEPHONE ENCOUNTER
Date of last visit:  8/14/2023  Date of next visit:  12/8/2023    Requested Prescriptions     Pending Prescriptions Disp Refills    tamsulosin (FLOMAX) 0.4 MG capsule [Pharmacy Med Name: TAMSULOSIN HCL 0.4 MG CAPSULE] 90 capsule 1     Sig: TAKE 1 CAPSULE BY MOUTH EVERY DAY

## 2023-12-08 ENCOUNTER — OFFICE VISIT (OUTPATIENT)
Dept: FAMILY MEDICINE CLINIC | Age: 88
End: 2023-12-08

## 2023-12-08 VITALS — RESPIRATION RATE: 16 BRPM | HEART RATE: 78 BPM | SYSTOLIC BLOOD PRESSURE: 114 MMHG | DIASTOLIC BLOOD PRESSURE: 66 MMHG

## 2023-12-08 DIAGNOSIS — N40.0 BENIGN PROSTATIC HYPERPLASIA WITHOUT LOWER URINARY TRACT SYMPTOMS: ICD-10-CM

## 2023-12-08 DIAGNOSIS — I48.0 PAF (PAROXYSMAL ATRIAL FIBRILLATION) (HCC): Primary | ICD-10-CM

## 2023-12-08 DIAGNOSIS — I35.0 SEVERE AORTIC STENOSIS: ICD-10-CM

## 2023-12-08 DIAGNOSIS — I25.10 CORONARY ARTERY DISEASE INVOLVING NATIVE CORONARY ARTERY OF NATIVE HEART WITHOUT ANGINA PECTORIS: ICD-10-CM

## 2023-12-08 DIAGNOSIS — E78.00 HYPERCHOLESTEROLEMIA: ICD-10-CM

## 2023-12-08 DIAGNOSIS — I50.42 CHRONIC COMBINED SYSTOLIC AND DIASTOLIC CHF (CONGESTIVE HEART FAILURE) (HCC): ICD-10-CM

## 2023-12-08 RX ORDER — TAMSULOSIN HYDROCHLORIDE 0.4 MG/1
0.4 CAPSULE ORAL DAILY
Qty: 90 CAPSULE | Refills: 1 | Status: SHIPPED | OUTPATIENT
Start: 2023-12-08

## 2023-12-08 ASSESSMENT — ENCOUNTER SYMPTOMS
SORE THROAT: 0
TROUBLE SWALLOWING: 0
COUGH: 0
CONSTIPATION: 0
ABDOMINAL PAIN: 0
BACK PAIN: 0
NAUSEA: 0
EYE PAIN: 0
BLOOD IN STOOL: 0
SHORTNESS OF BREATH: 0
CHEST TIGHTNESS: 0

## 2023-12-08 NOTE — PROGRESS NOTES
Subjective:      Patient ID: Obie Presley is a 80 y.o. male. Hearing  noted  and  see  [place]  audiology       Ashd  stable          Par  atrial  fib      Combined   chf  noted         [facility]                   Congestive Heart Failure  Presents for follow-up visit. Pertinent negatives include no abdominal pain, chest pain, fatigue, near-syncope, palpitations, paroxysmal nocturnal dyspnea or shortness of breath. The symptoms have been stable. Compliance with total regimen is %. Compliance problems include adherence to diet. Compliance with diet is %. Compliance with exercise is %. Compliance with medications is %. Past Medical History:   Diagnosis Date    Acute congestive heart failure ([address]) 06/20/2022    Arthritis     CAD (coronary artery disease)     Cognitive changes 11/2022    COVID-19 6/19/2022    Hypercholesterolemia     Moderate malnutrition ([address]) 6/22/2022     Past Surgical History:   Procedure Laterality Date    CATARACT REMOVAL Bilateral 2023    Dr Red Varela (1-3-2023 Left, 1-- Right)     Social History     Socioeconomic History    Marital status:      Spouse name: Not on file    Number of children: Not on file    Years of education: Not on file    Highest education level: Not on file   Occupational History    Not on file   Tobacco Use    Smoking status: Never    Smokeless tobacco: Never   Vaping Use    Vaping Use: Never used   Substance and Sexual Activity    Alcohol use:  Yes     Alcohol/week: 14.0 standard drinks of alcohol     Types: 14 Cans of beer per week    Drug use: Never    Sexual activity: Not Currently   Other Topics Concern    Not on file   Social History Narrative    Not on file     Social Determinants of Health     Financial Resource Strain: Low Risk  (4/3/2023)    Overall Financial Resource Strain (CARDIA)     Difficulty of Paying Living Expenses: Not hard at all   Food Insecurity: Not on file (4/3/2023)   Transportation Needs: Unknown

## 2024-01-18 ENCOUNTER — TELEPHONE (OUTPATIENT)
Dept: FAMILY MEDICINE CLINIC | Age: 89
End: 2024-01-18

## 2024-01-18 NOTE — TELEPHONE ENCOUNTER
Pt's daughter called pt was able to ear then be can longer here out of his head phones pt's daughter took him to audiology they stated pt it's either an ear infection or fluid in the ear she is asking for something to be called in for pt tried to a set up an appt for tomorrow and she stated it's hard to get him out of the house    Please send to [facility]

## 2024-01-19 NOTE — TELEPHONE ENCOUNTER
Lisha informed via phone. Daughter wondered if it was wax or something and wanted something called in.

## 2024-01-19 NOTE — TELEPHONE ENCOUNTER
Is let her know that I do understand that it is difficult to get him out of the house but we need to figure out what is going on with his ear whether he has an infection, wax buildup or fluid to be treated accordingly

## 2024-01-22 ENCOUNTER — TELEPHONE (OUTPATIENT)
Dept: FAMILY MEDICINE CLINIC | Age: 89
End: 2024-01-22

## 2024-01-22 DIAGNOSIS — R09.81 CHRONIC NASAL CONGESTION: Primary | ICD-10-CM

## 2024-01-22 RX ORDER — IPRATROPIUM BROMIDE 42 UG/1
2 SPRAY, METERED NASAL 3 TIMES DAILY
Qty: 15 ML | Refills: 1 | Status: SHIPPED | OUTPATIENT
Start: 2024-01-22

## 2024-01-22 NOTE — TELEPHONE ENCOUNTER
Pt daughter called says pt has been dealing with some plugged ears along with issues with hearing. Would like to know if dr could send something in for him.   [facility] [place]

## 2024-01-22 NOTE — TELEPHONE ENCOUNTER
Is  there  wax in ears  as use debrox 4 gtts bid      For congestion   use  coricidin hbp  and  sent  in  atrovent  0.06% 2  sprays  each nostril tid  and if  not better  May need ears looked at to make  sure  no  wax  if  persist in  2 weeks    Please call

## 2024-02-14 ENCOUNTER — TELEPHONE (OUTPATIENT)
Dept: FAMILY MEDICINE CLINIC | Age: 89
End: 2024-02-14

## 2024-02-14 NOTE — TELEPHONE ENCOUNTER
Jossie called stating ENT, Dr Fitch  ordered pt today an RX for PDN 40 mg and an ATB Cefdinir  For extreme fluid in pt's ears.    Jossie ask if you feel okay for pt to be on these meds?    Jossie ask if she could get called today to inform so pt can start meds.  [phone]

## 2024-04-09 ENCOUNTER — OFFICE VISIT (OUTPATIENT)
Dept: FAMILY MEDICINE CLINIC | Age: 89
End: 2024-04-09

## 2024-04-09 VITALS — RESPIRATION RATE: 18 BRPM | DIASTOLIC BLOOD PRESSURE: 72 MMHG | HEART RATE: 100 BPM | SYSTOLIC BLOOD PRESSURE: 118 MMHG

## 2024-04-09 DIAGNOSIS — I48.0 PAF (PAROXYSMAL ATRIAL FIBRILLATION) (HCC): ICD-10-CM

## 2024-04-09 DIAGNOSIS — I45.10 RBBB: ICD-10-CM

## 2024-04-09 DIAGNOSIS — N40.0 ENLARGED PROSTATE: ICD-10-CM

## 2024-04-09 DIAGNOSIS — I50.42 CHRONIC COMBINED SYSTOLIC AND DIASTOLIC CHF (CONGESTIVE HEART FAILURE) (HCC): ICD-10-CM

## 2024-04-09 DIAGNOSIS — Z00.00 MEDICARE ANNUAL WELLNESS VISIT, SUBSEQUENT: Primary | ICD-10-CM

## 2024-04-09 DIAGNOSIS — E78.00 HYPERCHOLESTEROLEMIA: ICD-10-CM

## 2024-04-09 DIAGNOSIS — I35.0 SEVERE AORTIC STENOSIS: ICD-10-CM

## 2024-04-09 DIAGNOSIS — I25.10 CORONARY ARTERY DISEASE INVOLVING NATIVE CORONARY ARTERY OF NATIVE HEART WITHOUT ANGINA PECTORIS: ICD-10-CM

## 2024-04-09 RX ORDER — GINSENG 100 MG
50 CAPSULE ORAL DAILY
COMMUNITY

## 2024-04-09 RX ORDER — AZITHROMYCIN 250 MG/1
TABLET, FILM COATED ORAL
Qty: 6 TABLET | Refills: 0 | Status: SHIPPED | OUTPATIENT
Start: 2024-04-09 | End: 2024-04-19

## 2024-04-09 SDOH — ECONOMIC STABILITY: FOOD INSECURITY: WITHIN THE PAST 12 MONTHS, THE FOOD YOU BOUGHT JUST DIDN'T LAST AND YOU DIDN'T HAVE MONEY TO GET MORE.: NEVER TRUE

## 2024-04-09 SDOH — ECONOMIC STABILITY: INCOME INSECURITY: HOW HARD IS IT FOR YOU TO PAY FOR THE VERY BASICS LIKE FOOD, HOUSING, MEDICAL CARE, AND HEATING?: NOT HARD AT ALL

## 2024-04-09 SDOH — ECONOMIC STABILITY: FOOD INSECURITY: WITHIN THE PAST 12 MONTHS, YOU WORRIED THAT YOUR FOOD WOULD RUN OUT BEFORE YOU GOT MONEY TO BUY MORE.: NEVER TRUE

## 2024-04-09 ASSESSMENT — ENCOUNTER SYMPTOMS
BLOOD IN STOOL: 0
TROUBLE SWALLOWING: 0
CHEST TIGHTNESS: 0
NAUSEA: 0
SORE THROAT: 0
COUGH: 0
EYE PAIN: 0
CONSTIPATION: 0
ABDOMINAL PAIN: 0
BACK PAIN: 0
SHORTNESS OF BREATH: 0

## 2024-04-09 ASSESSMENT — LIFESTYLE VARIABLES
HOW OFTEN DURING THE LAST YEAR HAVE YOU HAD A FEELING OF GUILT OR REMORSE AFTER DRINKING: NEVER
HAS A RELATIVE, FRIEND, DOCTOR, OR ANOTHER HEALTH PROFESSIONAL EXPRESSED CONCERN ABOUT YOUR DRINKING OR SUGGESTED YOU CUT DOWN: NO
HAVE YOU OR SOMEONE ELSE BEEN INJURED AS A RESULT OF YOUR DRINKING: NO
HOW OFTEN DURING THE LAST YEAR HAVE YOU FAILED TO DO WHAT WAS NORMALLY EXPECTED FROM YOU BECAUSE OF DRINKING: NEVER
HOW MANY STANDARD DRINKS CONTAINING ALCOHOL DO YOU HAVE ON A TYPICAL DAY: 1 OR 2
HOW OFTEN DURING THE LAST YEAR HAVE YOU FOUND THAT YOU WERE NOT ABLE TO STOP DRINKING ONCE YOU HAD STARTED: NEVER
HOW OFTEN DO YOU HAVE A DRINK CONTAINING ALCOHOL: 4 OR MORE TIMES A WEEK
HOW OFTEN DURING THE LAST YEAR HAVE YOU BEEN UNABLE TO REMEMBER WHAT HAPPENED THE NIGHT BEFORE BECAUSE YOU HAD BEEN DRINKING: NEVER
HOW OFTEN DURING THE LAST YEAR HAVE YOU NEEDED AN ALCOHOLIC DRINK FIRST THING IN THE MORNING TO GET YOURSELF GOING AFTER A NIGHT OF HEAVY DRINKING: NEVER

## 2024-04-09 ASSESSMENT — PATIENT HEALTH QUESTIONNAIRE - PHQ9
SUM OF ALL RESPONSES TO PHQ QUESTIONS 1-9: 0
1. LITTLE INTEREST OR PLEASURE IN DOING THINGS: NOT AT ALL
2. FEELING DOWN, DEPRESSED OR HOPELESS: NOT AT ALL
SUM OF ALL RESPONSES TO PHQ QUESTIONS 1-9: 0
SUM OF ALL RESPONSES TO PHQ9 QUESTIONS 1 & 2: 0
SUM OF ALL RESPONSES TO PHQ QUESTIONS 1-9: 0
SUM OF ALL RESPONSES TO PHQ QUESTIONS 1-9: 0

## 2024-04-09 NOTE — PROGRESS NOTES
Cain Berry 550 MG CAPS Take 425 mg by mouth      GARLIC PO Take 2,000 mg by mouth daily       magnesium oxide (MAG-OX) 400 (240 Mg) MG tablet Take 1 tablet by mouth daily      Coenzyme Q10 (CO Q10) 100 MG CAPS Take 100 mg by mouth daily       MILK THISTLE EXTRACT PO Take 250 mg by mouth daily 2 capsules daily      aspirin 325 MG EC tablet Take by mouth daily        No current facility-administered medications for this visit.         Orders Placed This Encounter   Procedures    PA OFFICE OUTPATIENT VISIT 15 MINUTES [64078]              See in    4  mths      Subjective       Patient's complete Health Risk Assessment and screening values have been reviewed and are found in Flowsheets. The following problems were reviewed today and where indicated follow up appointments were made and/or referrals ordered.    Positive Risk Factor Screenings with Interventions:       Cognitive:   Clock Drawing Test (CDT): Normal  Words recalled: 0 Words Recalled  Total Score: (!) 2  Total Score Interpretation: Abnormal Mini-Cog    Interventions:   Stable             Activity, Diet, and Weight:  On average, how many days per week do you engage in moderate to strenuous exercise (like a brisk walk)?: 0 days  On average, how many minutes do you engage in exercise at this level?: 0 min    Do you eat balanced/healthy meals regularly?: Yes    There is no height or weight on file to calculate BMI.        Inactivity Interventions:    due  to  arthritis      noted           Vision Screen:  Do you have difficulty driving, watching TV, or doing any of your daily activities because of your eyesight?: No  Have you had an eye exam within the past year?: (!) No  No results found.    Interventions:     Stable      ADL's:   Patient reports needing help with:  Select all that apply: (!) Dressing, Grooming, Walking/Balance, Bathing  Select all that apply: (!) Transportation, Laundry, Housekeeping, Banking/Finances, Shopping, Telephone Use, Food

## 2024-04-09 NOTE — PATIENT INSTRUCTIONS
good idea to know your test results and keep a list of the medicines you take.  How can you care for yourself at home?  Diet    Use less salt when you cook and eat. This helps lower your blood pressure. Taste food before salting. Add only a little salt when you think you need it. With time, your taste buds will adjust to less salt.     Eat fewer snack items, fast foods, canned soups, and other high-salt, high-fat, processed foods.     Read food labels and try to avoid saturated and trans fats. They increase your risk of heart disease by raising cholesterol levels.     Limit the amount of solid fat--butter, margarine, and shortening--you eat. Use olive, peanut, or canola oil when you cook. Bake, broil, and steam foods instead of frying them.     Eat a variety of fruit and vegetables every day. Dark green, deep orange, red, or yellow fruits and vegetables are especially good for you. Examples include spinach, carrots, peaches, and berries.     Foods high in fiber can reduce your cholesterol and provide important vitamins and minerals. High-fiber foods include whole-grain cereals and breads, oatmeal, beans, brown rice, citrus fruits, and apples.     Eat lean proteins. Heart-healthy proteins include seafood, lean meats and poultry, eggs, beans, peas, nuts, seeds, and soy products.     Limit drinks and foods with added sugar. These include candy, desserts, and soda pop.   Heart-healthy lifestyle    If your doctor recommends it, get more exercise. For many people, walking is a good choice. Or you may want to swim, bike, or do other activities. Bit by bit, increase the time you're active every day. Try for at least 30 minutes on most days of the week.     Try to quit or cut back on using tobacco and other nicotine products. This includes smoking and vaping. If you need help quitting, talk to your doctor about stop-smoking programs and medicines. These can increase your chances of quitting for good. Quitting is one of the

## 2024-06-17 ENCOUNTER — TELEPHONE (OUTPATIENT)
Dept: FAMILY MEDICINE CLINIC | Age: 89
End: 2024-06-17

## 2024-06-17 DIAGNOSIS — R09.81 CHRONIC NASAL CONGESTION: ICD-10-CM

## 2024-06-17 RX ORDER — CEFDINIR 300 MG/1
300 CAPSULE ORAL 2 TIMES DAILY
Qty: 20 CAPSULE | Refills: 0 | Status: SHIPPED | OUTPATIENT
Start: 2024-06-17 | End: 2024-06-27

## 2024-06-17 NOTE — TELEPHONE ENCOUNTER
Pt's daughter called stating breztri did help but it's still diminished in some spots of lungs can hear it and pt has been coughing a lot    Please call Jossie called [phone]

## 2024-06-17 NOTE — TELEPHONE ENCOUNTER
Date of last visit:  4/9/2024  Date of next visit:  8/19/2024    Requested Prescriptions     Pending Prescriptions Disp Refills    ipratropium (ATROVENT) 0.06 % nasal spray [Pharmacy Med Name: IPRATROPIUM 0.06% SPRAY] 15 mL 1     Sig: USE 2 SPRAYS BY EACH NOSTRIL ROUTE 3 TIMES DAILY

## 2024-06-17 NOTE — TELEPHONE ENCOUNTER
Per verbal order of dr. Cornell. Sent in  omnicef 300mg 1 tab bid po for 10 days to [facility] dagoberto Husain was informed

## 2024-06-18 RX ORDER — IPRATROPIUM BROMIDE 42 UG/1
SPRAY, METERED NASAL
Qty: 15 ML | Refills: 1 | Status: SHIPPED | OUTPATIENT
Start: 2024-06-18

## 2024-06-25 ENCOUNTER — TELEPHONE (OUTPATIENT)
Dept: FAMILY MEDICINE CLINIC | Age: 89
End: 2024-06-25

## 2024-06-25 NOTE — TELEPHONE ENCOUNTER
Daughter,Jossie, called asking if pt could get an RX for a cough expectorant to help loosen the mucus in pt's chest.    Pt has 1 more day of ATB left.    Pt is still coughing, almost gagging from the mucus.    If no RX what OTC can pt use?    [facility] [place]     Please call Lisha once addressed

## 2024-06-28 ENCOUNTER — TELEPHONE (OUTPATIENT)
Dept: FAMILY MEDICINE CLINIC | Age: 89
End: 2024-06-28

## 2024-06-28 DIAGNOSIS — R09.81 CHRONIC NASAL CONGESTION: ICD-10-CM

## 2024-06-28 RX ORDER — LEVOFLOXACIN 500 MG/1
500 TABLET, FILM COATED ORAL DAILY
Qty: 5 TABLET | Refills: 0 | Status: SHIPPED | OUTPATIENT
Start: 2024-06-28 | End: 2024-07-03 | Stop reason: SDUPTHER

## 2024-06-28 NOTE — TELEPHONE ENCOUNTER
Lisha called stating that pt is still wheezing and coughing really bad. She said that the cough is deep and he starts gagging from coughing so much. She is asking if there is anything else that could be done. Pt has been on antibiotic for 10 days and not doing better.    [facility] [place] may be reached at [phone]

## 2024-06-28 NOTE — TELEPHONE ENCOUNTER
Date of last visit:  4/9/2024  Date of next visit:  8/19/2024    Requested Prescriptions     Pending Prescriptions Disp Refills    ipratropium (ATROVENT) 0.06 % nasal spray [Pharmacy Med Name: IPRATROPIUM 0.06% SPRAY] 45 mL 1     Sig: USE 2 SPRAYS BY EACH NOSTRIL ROUTE 3 TIMES DAILY

## 2024-06-28 NOTE — TELEPHONE ENCOUNTER
Date of last visit:  4/9/2024  Date of next visit:  8/19/2024    Requested Prescriptions     Signed Prescriptions Disp Refills    levoFLOXacin (LEVAQUIN) 500 MG tablet 5 tablet 0     Sig: Take 1 tablet by mouth daily for 5 days     Authorizing Provider: KRISTIN CORNELL     Ordering User: GATITO MENDOZA     Per verbal order from Dr Cornell: use Albuterol Inhaler 4x daily.     Spoke with oJssie- asked if the Gagging is from GERD or is he Aspirating. She stated he does not listen to her when she wants him to slow down when eating and drinking. She is unsure if he is Aspirating or not. She stated he Coughs a lot when drinking. She stated she was recently sick, so she did not know if he got sick from her.

## 2024-06-29 RX ORDER — IPRATROPIUM BROMIDE 42 UG/1
SPRAY, METERED NASAL
Qty: 45 ML | Refills: 1 | Status: SHIPPED | OUTPATIENT
Start: 2024-06-29

## 2024-07-02 ENCOUNTER — HOSPITAL ENCOUNTER (OUTPATIENT)
Dept: GENERAL RADIOLOGY | Age: 89
Discharge: HOME | End: 2024-07-02
Payer: MEDICARE

## 2024-07-02 ENCOUNTER — HOSPITAL ENCOUNTER (OUTPATIENT)
Age: 89
Discharge: HOME | End: 2024-07-02
Payer: MEDICARE

## 2024-07-02 ENCOUNTER — TELEPHONE (OUTPATIENT)
Dept: FAMILY MEDICINE CLINIC | Age: 89
End: 2024-07-02

## 2024-07-02 DIAGNOSIS — R05.1 ACUTE COUGH: Primary | ICD-10-CM

## 2024-07-02 DIAGNOSIS — R19.8 GAGGING EPISODE: ICD-10-CM

## 2024-07-02 DIAGNOSIS — I50.42 CHRONIC COMBINED SYSTOLIC AND DIASTOLIC CHF (CONGESTIVE HEART FAILURE) (HCC): ICD-10-CM

## 2024-07-02 DIAGNOSIS — I48.0 PAF (PAROXYSMAL ATRIAL FIBRILLATION) (HCC): ICD-10-CM

## 2024-07-02 DIAGNOSIS — N40.0 BENIGN PROSTATIC HYPERPLASIA WITHOUT LOWER URINARY TRACT SYMPTOMS: ICD-10-CM

## 2024-07-02 DIAGNOSIS — R05.1 ACUTE COUGH: ICD-10-CM

## 2024-07-02 PROCEDURE — 71046 X-RAY EXAM CHEST 2 VIEWS: CPT

## 2024-07-02 NOTE — TELEPHONE ENCOUNTER
Date of last visit:  4/9/2024  Date of next visit:  8/19/2024    Requested Prescriptions     Pending Prescriptions Disp Refills    metoprolol tartrate (LOPRESSOR) 25 MG tablet 90 tablet 1     Sig: Take 1 tablet by mouth daily    tamsulosin (FLOMAX) 0.4 MG capsule 90 capsule 1     Sig: Take 1 capsule by mouth daily    furosemide (LASIX) 40 MG tablet 20 tablet 0     Sig: Take 1 tablet by mouth daily as needed (weight  gain)

## 2024-07-02 NOTE — TELEPHONE ENCOUNTER
Per verbal order from Dr Cornell: get a CXR and MBS completed to see if he is Aspirating.     Jossie informed via Phone. She is get the CXR completed in [place].     MBS order faxed to [facility] and they will contact her with an appt day and time.

## 2024-07-02 NOTE — TELEPHONE ENCOUNTER
Pt's daughter called stating pt is wheezing and coughing to where he is gagging he is not getting anything up she stated he has on mucinex but it's not helping and an atb she is asking what they should do    Please call [phone]

## 2024-07-03 ENCOUNTER — TELEPHONE (OUTPATIENT)
Dept: FAMILY MEDICINE CLINIC | Age: 89
End: 2024-07-03

## 2024-07-03 RX ORDER — LEVOFLOXACIN 500 MG/1
500 TABLET, FILM COATED ORAL DAILY
Qty: 5 TABLET | Refills: 0 | Status: SHIPPED | OUTPATIENT
Start: 2024-07-03 | End: 2024-07-08

## 2024-07-03 RX ORDER — FUROSEMIDE 40 MG/1
40 TABLET ORAL DAILY PRN
Qty: 20 TABLET | Refills: 0 | Status: SHIPPED | OUTPATIENT
Start: 2024-07-03

## 2024-07-03 RX ORDER — TAMSULOSIN HYDROCHLORIDE 0.4 MG/1
0.4 CAPSULE ORAL DAILY
Qty: 90 CAPSULE | Refills: 1 | Status: SHIPPED | OUTPATIENT
Start: 2024-07-03

## 2024-07-03 RX ORDER — PREDNISONE 20 MG/1
TABLET ORAL
Qty: 12 TABLET | Refills: 0 | Status: SHIPPED | OUTPATIENT
Start: 2024-07-03

## 2024-07-03 NOTE — TELEPHONE ENCOUNTER
----- Message from Bry Cornell MD sent at 7/3/2024  6:44 AM EDT -----  Chest x-ray slightly worse is just not expanding out the lungs    Assuming the cough is occurring with eating and otherwise is not.  She can listen to the lungs and if he has wheezing may be needs to have home aerosols which I thought he had ?     Still do the modified barium swallow and see in the office  after the tests

## 2024-07-03 NOTE — TELEPHONE ENCOUNTER
Lisha informed.      She ask if you feel pt would benefit from another ATB to help clear his lungs from the pneumonia?    She is hearing audible wheezing and diminished lung sound in pt's right base.    Lisha ask if you mean by Aerosol a Nebulizer or an RX?    She would have to rent pt a Nebulizer Machine if so.    If Nebulizer, [facility] [place]    If ATB and Aerosol RX    [facility] [place]    Please call Lisha once adrresed

## 2024-07-08 NOTE — TELEPHONE ENCOUNTER
If not  improving  will need to see and do modifed barium swallow now as  may be recurrent if with aspiration

## 2024-07-09 ENCOUNTER — TELEPHONE (OUTPATIENT)
Dept: FAMILY MEDICINE CLINIC | Age: 89
End: 2024-07-09

## 2024-07-09 NOTE — TELEPHONE ENCOUNTER
[facility], [place]  Located in: [facility]  Address: [address] [place] [address] [address], [place] [address]  Phone: [phone]    Spoke with Lisha- she stated patient is declining rapidly. She does not know how much longer she can care for him at home. She spoke with her Sister and they would like to have Hospice consult patient. He is still dealing with his Respiratory Issues, not using his Incentive Spirometer or Pickle Device. She thinks he has given up.     Called Hospice and did the Consult over the Phone. Dx: CHF with Heart Disease. They stated they will reach out to Lisha.

## 2024-07-12 ENCOUNTER — OUTSIDE SERVICES (OUTPATIENT)
Age: 89
End: 2024-07-12

## 2024-07-12 NOTE — PROGRESS NOTES
[facility]  Initial Home Visit      Date: 7/12/2024  Name: Logan Villeda  MRN: [id number]  YOB: 1932  Admit Date to Home Hospice: 7/11/24  Primary Care:  Bry Cornell MD      Informant: the patient, his dtr.  Old records are reviewed.  Hospice nurse provides some information.    Chief Complaint: sob and wheeze    History of Present Illness: Logan Villeda is a 89 y.o. male, who is admitted to [facility] with severe aortic stenosis.  He has been sob and had wheezing at home despite multiple antibiotics, inhalers and a course of steroids.  Dtr notes it is worse in the afternoons/later in the day and he was wheezing badly when hospice nurse was there yesterday afternoon.  He often does ok overnight and wakes up not wheezing but it starts with activity.  Coughing is present but no notable sputum, and a course of mucinex did not help either.  No fever.  He has chronic swelling of his ankles,and his eet are cold and purple when his feet are down and this has been present for years.  Dtr also feels his abd is more distended although his bowels are going fine.  + some coughing with po.  Dtr notes she changed his tea cup so he no longer will tilt his head back and now uses a straw.  She does not give him lasix, and when does occasionally, only 1/2 pill at 20mg. He had worse swelling , including of his hands, after prednisone course recently which is finished.  Hand swelling is resolved.    He has chronic afib and HR has been  although she obtains this from pulse ox not from apical pulse.          Past Medical History:   Diagnosis Date    Acute congestive heart failure (HCC) 06/20/2022    Arthritis     CAD (coronary artery disease)     Cognitive changes 11/2022    COVID-19 6/19/2022    Hypercholesterolemia     Moderate malnutrition (HCC) 6/22/2022       Past Surgical History   has a past surgical history that includes Cataract removal (Bilateral, 2023).      Social History:

## 2024-07-15 ENCOUNTER — OUTSIDE SERVICES (OUTPATIENT)
Dept: PALLATIVE CARE | Age: 89
End: 2024-07-15

## 2024-07-15 NOTE — PROGRESS NOTES
Hospice Certification of Terminal Illness      89 yo with terminal dx of severe aortic stenosis.  He has comorbid afib, systolic and diastolic heart failure, CAD. He has had marked decline in function and increased sob often associated with wheezing since a pneumonia weeks ago.  PPS is  30% with performance status very reduced he now needs dependent transfer to wheelchair with inconsistent weight bearing.  Months ago he was often in wheelchiar but more self mobile.  1 year ago PPS 40% and 2 years ago 60%.  Change in wt is uncertain wth current about 185 and pt unable to stand sufficiently for wt at home.  He had a steroid course which appears to have caused increased fluid retention.  Infections with pneumonia by CXR about 2 weeks ago.  There is suspicion of aspiration but no plan to take pt for a swallowing study in his current weakened state.  Comorbid Conditions include dysphagia, CAD, diastolic and systolic chf, afib, bph, wheeze, sob  Never smoker, GFR was > 60 8/2023    DNRCC    I certify this pt has a life expectancy of 6 months or less if the disease follows it's normal expected course.    Kimberley Francois MD  Board Certified Hospice and Palliative Medicine  Hospice Medical Director Certified

## 2024-07-16 VITALS — RESPIRATION RATE: 28 BRPM | SYSTOLIC BLOOD PRESSURE: 122 MMHG | HEART RATE: 78 BPM | DIASTOLIC BLOOD PRESSURE: 64 MMHG

## 2024-07-16 PROBLEM — R41.0 DELIRIUM: Status: ACTIVE | Noted: 2024-07-16

## 2024-07-16 NOTE — PROGRESS NOTES
[facility]  Follow Up Home Visit       I personally was present and participated in all elements of this visit done jointly with resident Dr. Kvng Zayas.  I agree with his documentation and where I felt adjustments were needed, I have ammended note using and italics for additions.  Kimberley Francois MD    Date: 7/15/2024  Name: Logan Villeda  MRN: [id number]  YOB: 1932   Patient's PCP: Bry Cornell MD    Reason for Visit: agitation at night and not sleeping.  Follow up of heart rate meds.      Subjective:   Elderly male on hospice examined at his home. Patients daughters are both present during the examination. He is sitting upright at bedside appears very weak and tired. The reason for today's visit in person was to address the families concerns about patient's agitation and worsening delerium which occurs in the evenings. Patient seems comfortable during examination. He is resting peacefully and appears to be in no acute distress. Patient does respond to verbal stimuli.   Dtrs report pt has had restlessness including pullilng off oxygen and trying to remove clothing and covers for past 2-3 nights.  Starts about midnight.  They have given compazine 2.5 with repeat x1 and compazine 5mg with repeat x1 without effectiveness.  Despite this pt is able to be oriented and have appropriate discussions with family and friends who call on him during the day.    Breathing is up and down, has restarted intermittent wheezing including coughing spells which are typically not productive and some abd muscle use for breathing.  He has received morphine 2.5mg po with some effect and breathing less labored.    Pt has not had chest pain and he did have chest pain with heart attack in past.  No fever.  No choking or coughing with po an pt is able to recognize his swallowing is easier with the small straw for lesser amounts.    History is primary obtained by patients daughters     Objective:   Patient is

## 2024-07-17 ENCOUNTER — OUTSIDE SERVICES (OUTPATIENT)
Dept: INTERNAL MEDICINE CLINIC | Age: 89
End: 2024-07-17

## 2024-07-17 NOTE — PROGRESS NOTES
unremarkable, sclerae are without icterus or injection   Heart: RRR,BENEDICT heard L-sternal border   Lungs: poor inspiratory effort, NO wheezing today, Mild crackling heard in the left lung    Abdomen: soft, + no tenderness, nondistended  Extremities:  Calves roughly equal and soft. Improved flow to LE BL. Skin mottling has improved. Temperate of the LE has increased  Neurologic: Bed bound at baseline, Awake and somewhat alert, responds to verbal stimuli Feet were nice and warm compared to last visit       Review of Systems       As noted in [place],  Constitutional: No fever, chills, + decreased appetite, ? weight loss  HEENT:  No acute visual changes, hard of hearing uses hearing aids   CV:  No chest pain, + sob,   PULM:  Poor inspiratory effort , no wheezing heard bl, mild crackling present during on left sided chest   GI:  No nausea, vomiting, diarrhea, constipation.    :  No dysuria, hematuria , larson catheter in place   Musculoskeletal:  requires assistance to be adjusted in the bed   Neuro: Awake,Alert,responds to verbal stimuli,no changes in speech or vision + restlessness at night      Assessment/Plan:       Agitation and Delerium - Continued  - Has agitation in the evenings   -Medications and the timeline to be taken has been re adjusted   - Continue Medications ordered for delirium:  use as directed for the symptoms.   -haldol,Seroquel,Phenobarbital used for comfort care  -Use Haldol 4mg scheduled prior to escalation in the evening at bedtime and if needed escalate to Seroquel 50 mg -100 mg and if the agitation or delirium is not controlled next use phenobarbital.   -instructions have been given thoroughly to the family      Chronic A.fibb w/Tachycardia  - Has chronic A.Fibb   -Goal HR under 100.  -Pule within normal range at visit  -Continue to monitor vitals routinely   -Readjust dosing as appropriate         3.Dysphagia   -poor coughing effort   -No choking noted when given fluids from a straw   -Patient is